# Patient Record
Sex: FEMALE | Race: BLACK OR AFRICAN AMERICAN
[De-identification: names, ages, dates, MRNs, and addresses within clinical notes are randomized per-mention and may not be internally consistent; named-entity substitution may affect disease eponyms.]

---

## 2019-01-18 ENCOUNTER — HOSPITAL ENCOUNTER (INPATIENT)
Dept: HOSPITAL 8 - 3E | Age: 59
LOS: 13 days | Discharge: HOME | DRG: 885 | End: 2019-01-31
Attending: PSYCHIATRY & NEUROLOGY | Admitting: PSYCHIATRY & NEUROLOGY
Payer: MEDICARE

## 2019-01-18 VITALS — BODY MASS INDEX: 24.65 KG/M2 | HEIGHT: 63 IN | WEIGHT: 139.11 LBS
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
## 2019-01-18 DIAGNOSIS — F17.210: ICD-10-CM

## 2019-01-18 DIAGNOSIS — F10.20: ICD-10-CM

## 2019-01-18 DIAGNOSIS — Z59.0: ICD-10-CM

## 2019-01-18 DIAGNOSIS — F20.0: Primary | ICD-10-CM

## 2019-01-18 DIAGNOSIS — R45.851: ICD-10-CM

## 2019-01-18 DIAGNOSIS — F32.9: ICD-10-CM

## 2019-01-18 DIAGNOSIS — E87.1: ICD-10-CM

## 2019-01-18 DIAGNOSIS — K08.89: ICD-10-CM

## 2019-01-18 PROCEDURE — 36415 COLL VENOUS BLD VENIPUNCTURE: CPT

## 2019-01-18 PROCEDURE — 86592 SYPHILIS TEST NON-TREP QUAL: CPT

## 2019-01-18 PROCEDURE — 93005 ELECTROCARDIOGRAM TRACING: CPT

## 2019-01-18 PROCEDURE — 81001 URINALYSIS AUTO W/SCOPE: CPT

## 2019-01-18 PROCEDURE — 82607 VITAMIN B-12: CPT

## 2019-01-18 PROCEDURE — 84703 CHORIONIC GONADOTROPIN ASSAY: CPT

## 2019-01-18 PROCEDURE — 84439 ASSAY OF FREE THYROXINE: CPT

## 2019-01-18 PROCEDURE — 85651 RBC SED RATE NONAUTOMATED: CPT

## 2019-01-18 PROCEDURE — 80061 LIPID PANEL: CPT

## 2019-01-18 PROCEDURE — 84443 ASSAY THYROID STIM HORMONE: CPT

## 2019-01-18 PROCEDURE — 82140 ASSAY OF AMMONIA: CPT

## 2019-01-18 PROCEDURE — G0378 HOSPITAL OBSERVATION PER HR: HCPCS

## 2019-01-18 PROCEDURE — 71045 X-RAY EXAM CHEST 1 VIEW: CPT

## 2019-01-18 SDOH — ECONOMIC STABILITY - HOUSING INSECURITY: HOMELESSNESS: Z59.0

## 2019-01-19 VITALS — SYSTOLIC BLOOD PRESSURE: 95 MMHG | DIASTOLIC BLOOD PRESSURE: 59 MMHG

## 2019-01-19 VITALS — DIASTOLIC BLOOD PRESSURE: 71 MMHG | SYSTOLIC BLOOD PRESSURE: 109 MMHG

## 2019-01-19 VITALS — SYSTOLIC BLOOD PRESSURE: 154 MMHG | DIASTOLIC BLOOD PRESSURE: 85 MMHG

## 2019-01-19 LAB
CHOL/HDL RATIO: 2.5
CULTURE INDICATED?: NO
ERYTHROCYTE [SEDIMENTATION RATE] IN BLOOD BY WESTERGREN METHOD: 14 MM/HR (ref 0–20)
HCT (SEDRATE): 37.7 % (ref 34.6–47.8)
HDL CHOL %: 40 % (ref 28–40)
HDL CHOLESTEROL (DIRECT): 70 MG/DL (ref 40–60)
LDL CHOLESTEROL,CALCULATED: 86 MG/DL (ref 54–169)
LDLC/HDLC SERPL: 1.2 {RATIO} (ref 0.5–3)
MICROSCOPIC: (no result)
T4 FREE SERPL-MCNC: 0.79 NG/DL (ref 0.76–1.46)
TRIGL SERPL-MCNC: 102 MG/DL (ref 50–200)
TSH SERPL-ACNC: 2.44 MIU/L (ref 0.36–3.74)
VLDLC SERPL CALC-MCNC: 20 MG/DL (ref 0–25)

## 2019-01-19 RX ADMIN — ACAMPROSATE CALCIUM ENTERIC-COATED SCH MG: 333 TABLET, DELAYED RELEASE ORAL at 15:57

## 2019-01-19 RX ADMIN — NICOTINE SCH PATCH: 14 PATCH, EXTENDED RELEASE TRANSDERMAL at 13:29

## 2019-01-19 RX ADMIN — ACAMPROSATE CALCIUM ENTERIC-COATED SCH MG: 333 TABLET, DELAYED RELEASE ORAL at 20:27

## 2019-01-19 RX ADMIN — QUETIAPINE FUMARATE SCH MG: 100 TABLET ORAL at 20:28

## 2019-01-19 RX ADMIN — NICOTINE SCH PATCH: 14 PATCH, EXTENDED RELEASE TRANSDERMAL at 01:27

## 2019-01-20 VITALS — DIASTOLIC BLOOD PRESSURE: 77 MMHG | SYSTOLIC BLOOD PRESSURE: 114 MMHG

## 2019-01-20 VITALS — SYSTOLIC BLOOD PRESSURE: 120 MMHG | DIASTOLIC BLOOD PRESSURE: 80 MMHG

## 2019-01-20 RX ADMIN — ACAMPROSATE CALCIUM ENTERIC-COATED SCH MG: 333 TABLET, DELAYED RELEASE ORAL at 09:08

## 2019-01-20 RX ADMIN — ACAMPROSATE CALCIUM ENTERIC-COATED SCH MG: 333 TABLET, DELAYED RELEASE ORAL at 16:38

## 2019-01-20 RX ADMIN — NICOTINE SCH PATCH: 14 PATCH, EXTENDED RELEASE TRANSDERMAL at 09:08

## 2019-01-20 RX ADMIN — QUETIAPINE FUMARATE SCH MG: 100 TABLET ORAL at 21:23

## 2019-01-20 RX ADMIN — ACAMPROSATE CALCIUM ENTERIC-COATED SCH MG: 333 TABLET, DELAYED RELEASE ORAL at 21:24

## 2019-01-20 RX ADMIN — QUETIAPINE FUMARATE PRN MG: 25 TABLET ORAL at 07:40

## 2019-01-20 RX ADMIN — NICOTINE SCH PATCH: 14 PATCH, EXTENDED RELEASE TRANSDERMAL at 00:30

## 2019-01-21 VITALS — SYSTOLIC BLOOD PRESSURE: 128 MMHG | DIASTOLIC BLOOD PRESSURE: 76 MMHG

## 2019-01-21 VITALS — DIASTOLIC BLOOD PRESSURE: 74 MMHG | SYSTOLIC BLOOD PRESSURE: 107 MMHG

## 2019-01-21 RX ADMIN — NICOTINE SCH PATCH: 14 PATCH, EXTENDED RELEASE TRANSDERMAL at 00:52

## 2019-01-21 RX ADMIN — ACAMPROSATE CALCIUM ENTERIC-COATED SCH MG: 333 TABLET, DELAYED RELEASE ORAL at 20:45

## 2019-01-21 RX ADMIN — QUETIAPINE FUMARATE SCH MG: 100 TABLET ORAL at 20:45

## 2019-01-21 RX ADMIN — NICOTINE SCH PATCH: 14 PATCH, EXTENDED RELEASE TRANSDERMAL at 13:07

## 2019-01-21 RX ADMIN — ACAMPROSATE CALCIUM ENTERIC-COATED SCH MG: 333 TABLET, DELAYED RELEASE ORAL at 16:23

## 2019-01-21 RX ADMIN — ACAMPROSATE CALCIUM ENTERIC-COATED SCH MG: 333 TABLET, DELAYED RELEASE ORAL at 08:34

## 2019-01-22 VITALS — DIASTOLIC BLOOD PRESSURE: 81 MMHG | SYSTOLIC BLOOD PRESSURE: 114 MMHG

## 2019-01-22 VITALS — SYSTOLIC BLOOD PRESSURE: 121 MMHG | DIASTOLIC BLOOD PRESSURE: 80 MMHG

## 2019-01-22 RX ADMIN — QUETIAPINE FUMARATE SCH MG: 100 TABLET ORAL at 20:20

## 2019-01-22 RX ADMIN — ACAMPROSATE CALCIUM ENTERIC-COATED SCH MG: 333 TABLET, DELAYED RELEASE ORAL at 20:20

## 2019-01-22 RX ADMIN — ACAMPROSATE CALCIUM ENTERIC-COATED SCH MG: 333 TABLET, DELAYED RELEASE ORAL at 09:43

## 2019-01-22 RX ADMIN — NICOTINE SCH PATCH: 14 PATCH, EXTENDED RELEASE TRANSDERMAL at 13:00

## 2019-01-22 RX ADMIN — NICOTINE SCH PATCH: 14 PATCH, EXTENDED RELEASE TRANSDERMAL at 01:03

## 2019-01-22 RX ADMIN — ACAMPROSATE CALCIUM ENTERIC-COATED SCH MG: 333 TABLET, DELAYED RELEASE ORAL at 17:01

## 2019-01-23 VITALS — SYSTOLIC BLOOD PRESSURE: 116 MMHG | DIASTOLIC BLOOD PRESSURE: 80 MMHG

## 2019-01-23 VITALS — SYSTOLIC BLOOD PRESSURE: 115 MMHG | DIASTOLIC BLOOD PRESSURE: 67 MMHG

## 2019-01-23 RX ADMIN — NICOTINE SCH PATCH: 14 PATCH, EXTENDED RELEASE TRANSDERMAL at 00:30

## 2019-01-23 RX ADMIN — QUETIAPINE FUMARATE SCH MG: 100 TABLET ORAL at 21:10

## 2019-01-23 RX ADMIN — ACAMPROSATE CALCIUM ENTERIC-COATED SCH MG: 333 TABLET, DELAYED RELEASE ORAL at 16:25

## 2019-01-23 RX ADMIN — ACAMPROSATE CALCIUM ENTERIC-COATED SCH MG: 333 TABLET, DELAYED RELEASE ORAL at 09:00

## 2019-01-23 RX ADMIN — ACAMPROSATE CALCIUM ENTERIC-COATED SCH MG: 333 TABLET, DELAYED RELEASE ORAL at 09:19

## 2019-01-23 RX ADMIN — ACETAMINOPHEN PRN MG: 325 TABLET, FILM COATED ORAL at 21:10

## 2019-01-23 RX ADMIN — NICOTINE SCH PATCH: 14 PATCH, EXTENDED RELEASE TRANSDERMAL at 14:00

## 2019-01-24 VITALS — DIASTOLIC BLOOD PRESSURE: 72 MMHG | SYSTOLIC BLOOD PRESSURE: 114 MMHG

## 2019-01-24 VITALS — DIASTOLIC BLOOD PRESSURE: 73 MMHG | SYSTOLIC BLOOD PRESSURE: 107 MMHG

## 2019-01-24 RX ADMIN — ACAMPROSATE CALCIUM ENTERIC-COATED SCH MG: 333 TABLET, DELAYED RELEASE ORAL at 21:09

## 2019-01-24 RX ADMIN — ACETAMINOPHEN PRN MG: 325 TABLET, FILM COATED ORAL at 15:51

## 2019-01-24 RX ADMIN — QUETIAPINE FUMARATE PRN MG: 25 TABLET ORAL at 08:38

## 2019-01-24 RX ADMIN — NICOTINE SCH PATCH: 14 PATCH, EXTENDED RELEASE TRANSDERMAL at 13:00

## 2019-01-24 RX ADMIN — ACETAMINOPHEN PRN MG: 325 TABLET, FILM COATED ORAL at 10:20

## 2019-01-24 RX ADMIN — ACAMPROSATE CALCIUM ENTERIC-COATED SCH MG: 333 TABLET, DELAYED RELEASE ORAL at 08:13

## 2019-01-24 RX ADMIN — NICOTINE SCH PATCH: 14 PATCH, EXTENDED RELEASE TRANSDERMAL at 08:00

## 2019-01-24 RX ADMIN — ACAMPROSATE CALCIUM ENTERIC-COATED SCH MG: 333 TABLET, DELAYED RELEASE ORAL at 15:41

## 2019-01-25 VITALS — SYSTOLIC BLOOD PRESSURE: 105 MMHG | DIASTOLIC BLOOD PRESSURE: 72 MMHG

## 2019-01-25 VITALS — DIASTOLIC BLOOD PRESSURE: 68 MMHG | SYSTOLIC BLOOD PRESSURE: 109 MMHG

## 2019-01-25 RX ADMIN — ZIPRASIDONE HCL SCH MG: 20 CAPSULE ORAL at 19:39

## 2019-01-25 RX ADMIN — RISPERIDONE SCH MG: 2 TABLET ORAL at 19:39

## 2019-01-25 RX ADMIN — ACAMPROSATE CALCIUM ENTERIC-COATED SCH MG: 333 TABLET, DELAYED RELEASE ORAL at 19:39

## 2019-01-25 RX ADMIN — ACETAMINOPHEN PRN MG: 325 TABLET, FILM COATED ORAL at 08:06

## 2019-01-25 RX ADMIN — NICOTINE SCH PATCH: 14 PATCH, EXTENDED RELEASE TRANSDERMAL at 15:28

## 2019-01-25 RX ADMIN — ACAMPROSATE CALCIUM ENTERIC-COATED SCH MG: 333 TABLET, DELAYED RELEASE ORAL at 08:06

## 2019-01-25 RX ADMIN — ACAMPROSATE CALCIUM ENTERIC-COATED SCH MG: 333 TABLET, DELAYED RELEASE ORAL at 15:27

## 2019-01-25 RX ADMIN — ACETAMINOPHEN PRN MG: 325 TABLET, FILM COATED ORAL at 18:35

## 2019-01-26 VITALS — DIASTOLIC BLOOD PRESSURE: 68 MMHG | SYSTOLIC BLOOD PRESSURE: 103 MMHG

## 2019-01-26 VITALS — SYSTOLIC BLOOD PRESSURE: 132 MMHG | DIASTOLIC BLOOD PRESSURE: 85 MMHG

## 2019-01-26 RX ADMIN — Medication PRN APPLIC: at 20:50

## 2019-01-26 RX ADMIN — ZIPRASIDONE HCL SCH MG: 20 CAPSULE ORAL at 08:14

## 2019-01-26 RX ADMIN — ZIPRASIDONE HCL SCH MG: 20 CAPSULE ORAL at 20:49

## 2019-01-26 RX ADMIN — ACETAMINOPHEN PRN MG: 325 TABLET, FILM COATED ORAL at 14:08

## 2019-01-26 RX ADMIN — ACETAMINOPHEN PRN MG: 325 TABLET, FILM COATED ORAL at 09:48

## 2019-01-26 RX ADMIN — ACAMPROSATE CALCIUM ENTERIC-COATED SCH MG: 333 TABLET, DELAYED RELEASE ORAL at 20:49

## 2019-01-26 RX ADMIN — RISPERIDONE SCH MG: 2 TABLET ORAL at 08:15

## 2019-01-26 RX ADMIN — RISPERIDONE SCH MG: 2 TABLET ORAL at 20:50

## 2019-01-26 RX ADMIN — ACAMPROSATE CALCIUM ENTERIC-COATED SCH MG: 333 TABLET, DELAYED RELEASE ORAL at 15:55

## 2019-01-26 RX ADMIN — ACETAMINOPHEN PRN MG: 325 TABLET, FILM COATED ORAL at 20:50

## 2019-01-26 RX ADMIN — Medication PRN APPLIC: at 14:08

## 2019-01-26 RX ADMIN — ACAMPROSATE CALCIUM ENTERIC-COATED SCH MG: 333 TABLET, DELAYED RELEASE ORAL at 08:14

## 2019-01-26 RX ADMIN — NICOTINE SCH PATCH: 14 PATCH, EXTENDED RELEASE TRANSDERMAL at 13:41

## 2019-01-27 VITALS — SYSTOLIC BLOOD PRESSURE: 137 MMHG | DIASTOLIC BLOOD PRESSURE: 80 MMHG

## 2019-01-27 VITALS — SYSTOLIC BLOOD PRESSURE: 121 MMHG | DIASTOLIC BLOOD PRESSURE: 82 MMHG

## 2019-01-27 RX ADMIN — ACAMPROSATE CALCIUM ENTERIC-COATED SCH MG: 333 TABLET, DELAYED RELEASE ORAL at 08:06

## 2019-01-27 RX ADMIN — Medication PRN APPLIC: at 18:18

## 2019-01-27 RX ADMIN — RISPERIDONE SCH MG: 2 TABLET ORAL at 20:48

## 2019-01-27 RX ADMIN — DOCUSATE SODIUM PRN MG: 100 CAPSULE, LIQUID FILLED ORAL at 08:14

## 2019-01-27 RX ADMIN — RISPERIDONE SCH MG: 2 TABLET ORAL at 08:06

## 2019-01-27 RX ADMIN — ACETAMINOPHEN PRN MG: 325 TABLET, FILM COATED ORAL at 08:07

## 2019-01-27 RX ADMIN — ACETAMINOPHEN PRN MG: 325 TABLET, FILM COATED ORAL at 13:58

## 2019-01-27 RX ADMIN — NICOTINE SCH PATCH: 14 PATCH, EXTENDED RELEASE TRANSDERMAL at 13:51

## 2019-01-27 RX ADMIN — ACAMPROSATE CALCIUM ENTERIC-COATED SCH MG: 333 TABLET, DELAYED RELEASE ORAL at 20:44

## 2019-01-27 RX ADMIN — ACETAMINOPHEN PRN MG: 325 TABLET, FILM COATED ORAL at 19:21

## 2019-01-27 RX ADMIN — NICOTINE SCH PATCH: 14 PATCH, EXTENDED RELEASE TRANSDERMAL at 13:53

## 2019-01-27 RX ADMIN — ZIPRASIDONE HCL SCH MG: 20 CAPSULE ORAL at 08:06

## 2019-01-27 RX ADMIN — ZIPRASIDONE HCL SCH MG: 20 CAPSULE ORAL at 20:45

## 2019-01-27 RX ADMIN — Medication PRN APPLIC: at 09:40

## 2019-01-27 RX ADMIN — ACAMPROSATE CALCIUM ENTERIC-COATED SCH MG: 333 TABLET, DELAYED RELEASE ORAL at 16:58

## 2019-01-28 VITALS — DIASTOLIC BLOOD PRESSURE: 84 MMHG | SYSTOLIC BLOOD PRESSURE: 125 MMHG

## 2019-01-28 VITALS — DIASTOLIC BLOOD PRESSURE: 84 MMHG | SYSTOLIC BLOOD PRESSURE: 126 MMHG

## 2019-01-28 RX ADMIN — RISPERIDONE SCH MG: 2 TABLET ORAL at 20:13

## 2019-01-28 RX ADMIN — Medication PRN APPLIC: at 18:31

## 2019-01-28 RX ADMIN — DOCUSATE SODIUM PRN MG: 100 CAPSULE, LIQUID FILLED ORAL at 08:18

## 2019-01-28 RX ADMIN — ACETAMINOPHEN PRN MG: 325 TABLET, FILM COATED ORAL at 20:14

## 2019-01-28 RX ADMIN — RISPERIDONE SCH MG: 2 TABLET ORAL at 08:07

## 2019-01-28 RX ADMIN — ACAMPROSATE CALCIUM ENTERIC-COATED SCH MG: 333 TABLET, DELAYED RELEASE ORAL at 20:12

## 2019-01-28 RX ADMIN — ZIPRASIDONE HCL SCH MG: 20 CAPSULE ORAL at 08:06

## 2019-01-28 RX ADMIN — ACAMPROSATE CALCIUM ENTERIC-COATED SCH MG: 333 TABLET, DELAYED RELEASE ORAL at 08:06

## 2019-01-28 RX ADMIN — NICOTINE SCH PATCH: 14 PATCH, EXTENDED RELEASE TRANSDERMAL at 13:34

## 2019-01-28 RX ADMIN — ACAMPROSATE CALCIUM ENTERIC-COATED SCH MG: 333 TABLET, DELAYED RELEASE ORAL at 16:55

## 2019-01-28 RX ADMIN — ACETAMINOPHEN PRN MG: 325 TABLET, FILM COATED ORAL at 13:43

## 2019-01-28 RX ADMIN — ACETAMINOPHEN PRN MG: 325 TABLET, FILM COATED ORAL at 08:07

## 2019-01-28 RX ADMIN — ZIPRASIDONE HCL SCH MG: 20 CAPSULE ORAL at 20:12

## 2019-01-29 VITALS — DIASTOLIC BLOOD PRESSURE: 77 MMHG | SYSTOLIC BLOOD PRESSURE: 119 MMHG

## 2019-01-29 VITALS — SYSTOLIC BLOOD PRESSURE: 105 MMHG | DIASTOLIC BLOOD PRESSURE: 64 MMHG

## 2019-01-29 RX ADMIN — ZIPRASIDONE HCL SCH MG: 20 CAPSULE ORAL at 20:20

## 2019-01-29 RX ADMIN — ACETAMINOPHEN PRN MG: 325 TABLET, FILM COATED ORAL at 08:14

## 2019-01-29 RX ADMIN — NICOTINE SCH PATCH: 14 PATCH, EXTENDED RELEASE TRANSDERMAL at 13:50

## 2019-01-29 RX ADMIN — ACETAMINOPHEN PRN MG: 325 TABLET, FILM COATED ORAL at 13:49

## 2019-01-29 RX ADMIN — DOCUSATE SODIUM PRN MG: 100 CAPSULE, LIQUID FILLED ORAL at 08:14

## 2019-01-29 RX ADMIN — ACAMPROSATE CALCIUM ENTERIC-COATED SCH MG: 333 TABLET, DELAYED RELEASE ORAL at 08:15

## 2019-01-29 RX ADMIN — ACAMPROSATE CALCIUM ENTERIC-COATED SCH MG: 333 TABLET, DELAYED RELEASE ORAL at 20:19

## 2019-01-29 RX ADMIN — RISPERIDONE SCH MG: 2 TABLET ORAL at 08:15

## 2019-01-29 RX ADMIN — ZIPRASIDONE HCL SCH MG: 20 CAPSULE ORAL at 08:14

## 2019-01-29 RX ADMIN — ACAMPROSATE CALCIUM ENTERIC-COATED SCH MG: 333 TABLET, DELAYED RELEASE ORAL at 16:47

## 2019-01-29 RX ADMIN — RISPERIDONE SCH MG: 2 TABLET ORAL at 20:20

## 2019-01-30 VITALS — DIASTOLIC BLOOD PRESSURE: 74 MMHG | SYSTOLIC BLOOD PRESSURE: 112 MMHG

## 2019-01-30 VITALS — SYSTOLIC BLOOD PRESSURE: 107 MMHG | DIASTOLIC BLOOD PRESSURE: 72 MMHG

## 2019-01-30 RX ADMIN — ZIPRASIDONE HCL SCH MG: 20 CAPSULE ORAL at 20:29

## 2019-01-30 RX ADMIN — ACAMPROSATE CALCIUM ENTERIC-COATED SCH MG: 333 TABLET, DELAYED RELEASE ORAL at 08:49

## 2019-01-30 RX ADMIN — RISPERIDONE SCH MG: 2 TABLET ORAL at 08:50

## 2019-01-30 RX ADMIN — ZIPRASIDONE HCL SCH MG: 20 CAPSULE ORAL at 08:50

## 2019-01-30 RX ADMIN — NICOTINE SCH PATCH: 14 PATCH, EXTENDED RELEASE TRANSDERMAL at 12:51

## 2019-01-30 RX ADMIN — ACAMPROSATE CALCIUM ENTERIC-COATED SCH MG: 333 TABLET, DELAYED RELEASE ORAL at 15:47

## 2019-01-30 RX ADMIN — ACAMPROSATE CALCIUM ENTERIC-COATED SCH MG: 333 TABLET, DELAYED RELEASE ORAL at 09:19

## 2019-01-31 VITALS — SYSTOLIC BLOOD PRESSURE: 106 MMHG | DIASTOLIC BLOOD PRESSURE: 75 MMHG

## 2019-01-31 RX ADMIN — ZIPRASIDONE HCL SCH MG: 20 CAPSULE ORAL at 07:54

## 2019-01-31 RX ADMIN — ACAMPROSATE CALCIUM ENTERIC-COATED SCH MG: 333 TABLET, DELAYED RELEASE ORAL at 07:53

## 2019-01-31 RX ADMIN — NICOTINE SCH PATCH: 14 PATCH, EXTENDED RELEASE TRANSDERMAL at 09:14

## 2019-02-06 ENCOUNTER — HOSPITAL ENCOUNTER (EMERGENCY)
Dept: HOSPITAL 8 - ED | Age: 59
Discharge: PSYCHIATRIC OR SUBSTANCE USE FACILITY | End: 2019-02-06
Payer: MEDICARE

## 2019-02-06 VITALS — WEIGHT: 137.79 LBS | BODY MASS INDEX: 24.41 KG/M2 | HEIGHT: 63 IN

## 2019-02-06 VITALS — SYSTOLIC BLOOD PRESSURE: 130 MMHG | DIASTOLIC BLOOD PRESSURE: 90 MMHG

## 2019-02-06 DIAGNOSIS — F23: Primary | ICD-10-CM

## 2019-02-06 DIAGNOSIS — Z72.9: ICD-10-CM

## 2019-02-06 LAB
ALBUMIN SERPL-MCNC: 4.1 G/DL (ref 3.4–5)
ALP SERPL-CCNC: 75 U/L (ref 45–117)
ALT SERPL-CCNC: 23 U/L (ref 12–78)
ANION GAP SERPL CALC-SCNC: 10 MMOL/L (ref 5–15)
APAP SERPL-MCNC: < 2 MCG/ML (ref 10–30)
BASOPHILS # BLD AUTO: 0.12 X10^3/UL (ref 0–0.1)
BASOPHILS NFR BLD AUTO: 1 % (ref 0–1)
BILIRUB SERPL-MCNC: 0.2 MG/DL (ref 0.2–1)
CALCIUM SERPL-MCNC: 9.3 MG/DL (ref 8.5–10.1)
CHLORIDE SERPL-SCNC: 109 MMOL/L (ref 98–107)
CREAT SERPL-MCNC: 0.94 MG/DL (ref 0.55–1.02)
EOSINOPHIL # BLD AUTO: 0.12 X10^3/UL (ref 0–0.4)
EOSINOPHIL NFR BLD AUTO: 1 % (ref 1–7)
ERYTHROCYTE [DISTWIDTH] IN BLOOD BY AUTOMATED COUNT: 14 % (ref 9.6–15.2)
LYMPHOCYTES # BLD AUTO: 4.41 X10^3/UL (ref 1–3.4)
LYMPHOCYTES NFR BLD AUTO: 40 % (ref 22–44)
MCH RBC QN AUTO: 32 PG (ref 27–34.8)
MCHC RBC AUTO-ENTMCNC: 33.3 G/DL (ref 32.4–35.8)
MCV RBC AUTO: 95.9 FL (ref 80–100)
MD: (no result)
MONOCYTES # BLD AUTO: 0.48 X10^3/UL (ref 0.2–0.8)
MONOCYTES NFR BLD AUTO: 4 % (ref 2–9)
NEUTROPHILS # BLD AUTO: 5.78 X10^3/UL (ref 1.8–6.8)
NEUTROPHILS NFR BLD AUTO: 53 % (ref 42–75)
PLATELET # BLD AUTO: 377 X10^3/UL (ref 130–400)
PMV BLD AUTO: 8.9 FL (ref 7.4–10.4)
PROT SERPL-MCNC: 9 G/DL (ref 6.4–8.2)
RBC # BLD AUTO: 5.01 X10^6/UL (ref 3.82–5.3)
VANCOMYCIN TROUGH SERPL-MCNC: 5.4 MG/DL (ref 2.8–20)

## 2019-02-06 PROCEDURE — G0480 DRUG TEST DEF 1-7 CLASSES: HCPCS

## 2019-02-06 PROCEDURE — 99284 EMERGENCY DEPT VISIT MOD MDM: CPT

## 2019-02-06 PROCEDURE — 85025 COMPLETE CBC W/AUTO DIFF WBC: CPT

## 2019-02-06 PROCEDURE — 93005 ELECTROCARDIOGRAM TRACING: CPT

## 2019-02-06 PROCEDURE — 80053 COMPREHEN METABOLIC PANEL: CPT

## 2019-02-06 PROCEDURE — 80307 DRUG TEST PRSMV CHEM ANLYZR: CPT

## 2019-02-06 PROCEDURE — 99285 EMERGENCY DEPT VISIT HI MDM: CPT

## 2019-02-06 PROCEDURE — 80329 ANALGESICS NON-OPIOID 1 OR 2: CPT

## 2019-02-06 PROCEDURE — G0378 HOSPITAL OBSERVATION PER HR: HCPCS

## 2019-02-06 PROCEDURE — 36415 COLL VENOUS BLD VENIPUNCTURE: CPT

## 2019-02-06 PROCEDURE — 96372 THER/PROPH/DIAG INJ SC/IM: CPT

## 2019-02-06 NOTE — NUR
THIS IS A 57 YO FEMALE WHO PRESENTS TO THE ER VIA AMBULANCE. PT WAS PICKED UP 
FROM THE BUS STATION AT [address] [place] [address] DURING A PYSCHOTIC EPISODE AND PT EXPRESSING 
SI THOUGHTS. PT IS AO X 4. PT HAS PRESSURED SPEECH, FLIGHT OF IDEAS. PT STATES 
"I'M ALREADY DEAD" AND HER PLAN IS TO "DRINK A BUNCH OF BEER". PT STATES SHE 
HAS NOT HAD MEDICATIONS SINCE THURSDAY AT LEAST. PT ADMITS TO DRINKING BEER 
TODAY. PT IS COOPERATIVE WITH RN BUT BECOMES AGITATED EASILY. BELONGINGS PLACED 
IN TWO BAGS AND PUT IN LOCKED LOCKER. GARAGE DOORS DOWN IN ROOM. SITTER AT 
DOORSIDE.

## 2019-02-06 NOTE — NUR
Provided bedside report to NENA Avila. All questions answered. NENA Avila to 
assume care of pt at this time.

## 2019-02-06 NOTE — NUR
RECEIVED REPORT AND ASSUMED PT. CARE.  PT.'S ROOM REMAINS SECURED WITH THE 
SITTER OUTSIDE OF HER ROOM.  PT. IS CALM AND COOPERATIVE.  PT. WAS GIVEN 
BLANKETS FOR WARMTH, HAS WATER AND IS AWARE THAT URINE IS NEEDED.

## 2019-02-06 NOTE — NUR
Patient/[facility] given discharge instructions and they have confirmed that they 
understand the instructions.  Patient ambulatory with steady gait with EMS 
crew. Pt transported with 2 bags of belongings.

## 2019-02-06 NOTE — NUR
SOC SPOKE WITH THE PT.  PT. IS A LEGAL 2K.  PT. ASKED THE SOC TO KILL HER WITH 
MEDICATION.  PT. IS SCREAMING AT THE SOC DURING THE INTERVIEW.

## 2019-02-06 NOTE — NUR
NENA Berg from [facility] called and requested report. Provided report. All 
questions answered. NENA Berg to call back with further information if [facility] is 
able to accept pt.

## 2019-02-06 NOTE — NUR
Pt recieved dinner tray. Pt appreciative. Pt watching TV and eating. NADN. 
Sitter near doorway in direct line of sight of pt. No needs requested at this 
time.

## 2019-02-06 NOTE — NUR
BEDSIDE REPORT RECEIVED FROM NENA MCKEON. ASSUMED CARE OF PT. PT SLEEPING ON 
GURNEY. RESPIRATIONS EVEN AND UNLABORED AT THIS TIME. AWAITING TRANSPORT TO [facility] 
AT THIS TIME. WILL CONTINUE TO MONITOR.

## 2019-02-06 NOTE — NUR
PT. IS SCREAMING PROFANITIES AND STATING THAT SHE WANTS US TO KILL HER.  PT. 
WAS MEDICATED AS ORDERED.

## 2019-02-06 NOTE — NUR
Recieved report from TASK RN Tracy. All questions answered. Assuming care of 
pt. Pt transported on gurney to ED room 41 from ED room 2. Pt states 
understanding and compliance. Pt alert, oriented to place, situation, and self. 
Pt cooperative at this time with ED RN. Pt in ED room 41. Room secured for SI 
precautions. Sitter near doorway in direct line of sight for observation. NADN. 
No needs expressed at this time. Turned TV on for pt. Pt appreciative. Turned 
bright overhead lighting off for pt. Pt appreciative.

## 2019-02-06 NOTE — NUR
Pt resting on \[facility]\"" watching TV. NADN. No needs requested at this 
time. Sitter near doorway in direct line of sight for observation.

## 2019-02-06 NOTE — NUR
BREAK RN: SBAR report received from Nahomi BARRETT. Pt sleeping on gurney, on right 
side. Sitter outside of room for pt safety.

## 2020-04-27 ENCOUNTER — HOSPITAL ENCOUNTER (INPATIENT)
Dept: HOSPITAL 8 - ED | Age: 60
LOS: 1 days | Discharge: PSYCHIATRIC OR SUBSTANCE USE FACILITY | DRG: 641 | End: 2020-04-28
Attending: INTERNAL MEDICINE | Admitting: INTERNAL MEDICINE
Payer: MEDICARE

## 2020-04-27 VITALS — DIASTOLIC BLOOD PRESSURE: 64 MMHG | SYSTOLIC BLOOD PRESSURE: 99 MMHG

## 2020-04-27 VITALS — WEIGHT: 164.46 LBS | BODY MASS INDEX: 29.14 KG/M2 | HEIGHT: 63 IN

## 2020-04-27 VITALS — SYSTOLIC BLOOD PRESSURE: 109 MMHG | DIASTOLIC BLOOD PRESSURE: 72 MMHG

## 2020-04-27 DIAGNOSIS — F20.0: ICD-10-CM

## 2020-04-27 DIAGNOSIS — F41.1: ICD-10-CM

## 2020-04-27 DIAGNOSIS — Z91.14: ICD-10-CM

## 2020-04-27 DIAGNOSIS — R45.851: ICD-10-CM

## 2020-04-27 DIAGNOSIS — E87.6: Primary | ICD-10-CM

## 2020-04-27 DIAGNOSIS — F17.200: ICD-10-CM

## 2020-04-27 LAB
ALBUMIN SERPL-MCNC: 2.8 G/DL (ref 3.4–5)
ANION GAP SERPL CALC-SCNC: 9 MMOL/L (ref 5–15)
BASOPHILS # BLD AUTO: 0.05 X10^3/UL (ref 0–0.1)
BASOPHILS NFR BLD AUTO: 1 % (ref 0–1)
CALCIUM SERPL-MCNC: 8.3 MG/DL (ref 8.5–10.1)
CHLORIDE SERPL-SCNC: 98 MMOL/L (ref 98–107)
CREAT SERPL-MCNC: 0.77 MG/DL (ref 0.55–1.02)
EOSINOPHIL # BLD AUTO: 0.02 X10^3/UL (ref 0–0.4)
EOSINOPHIL NFR BLD AUTO: 0 % (ref 1–7)
ERYTHROCYTE [DISTWIDTH] IN BLOOD BY AUTOMATED COUNT: 13.1 % (ref 9.6–15.2)
LYMPHOCYTES # BLD AUTO: 2.07 X10^3/UL (ref 1–3.4)
LYMPHOCYTES NFR BLD AUTO: 29 % (ref 22–44)
MCH RBC QN AUTO: 32.5 PG (ref 27–34.8)
MCHC RBC AUTO-ENTMCNC: 33.7 G/DL (ref 32.4–35.8)
MCV RBC AUTO: 96.4 FL (ref 80–100)
MD: (no result)
MONOCYTES # BLD AUTO: 0.59 X10^3/UL (ref 0.2–0.8)
MONOCYTES NFR BLD AUTO: 8 % (ref 2–9)
NEUTROPHILS # BLD AUTO: 4.36 X10^3/UL (ref 1.8–6.8)
NEUTROPHILS NFR BLD AUTO: 62 % (ref 42–75)
PLATELET # BLD AUTO: 274 X10^3/UL (ref 130–400)
PMV BLD AUTO: 8.5 FL (ref 7.4–10.4)
RBC # BLD AUTO: 4.17 X10^6/UL (ref 3.82–5.3)
VANCOMYCIN TROUGH SERPL-MCNC: 4.8 MG/DL (ref 2.8–20)

## 2020-04-27 PROCEDURE — 85025 COMPLETE CBC W/AUTO DIFF WBC: CPT

## 2020-04-27 PROCEDURE — 36415 COLL VENOUS BLD VENIPUNCTURE: CPT

## 2020-04-27 PROCEDURE — 96374 THER/PROPH/DIAG INJ IV PUSH: CPT

## 2020-04-27 PROCEDURE — 83735 ASSAY OF MAGNESIUM: CPT

## 2020-04-27 PROCEDURE — 80048 BASIC METABOLIC PNL TOTAL CA: CPT

## 2020-04-27 PROCEDURE — 82040 ASSAY OF SERUM ALBUMIN: CPT

## 2020-04-27 PROCEDURE — 93005 ELECTROCARDIOGRAM TRACING: CPT

## 2020-04-27 PROCEDURE — 80307 DRUG TEST PRSMV CHEM ANLYZR: CPT

## 2020-04-27 PROCEDURE — 84132 ASSAY OF SERUM POTASSIUM: CPT

## 2020-04-27 RX ADMIN — RISPERIDONE SCH MG: 2 TABLET ORAL at 12:30

## 2020-04-27 RX ADMIN — RISPERIDONE SCH MG: 2 TABLET ORAL at 20:47

## 2020-04-27 RX ADMIN — POTASSIUM CHLORIDE SCH MEQ: 20 TABLET, EXTENDED RELEASE ORAL at 18:33

## 2020-04-27 RX ADMIN — BENZTROPINE MESYLATE SCH MG: 1 TABLET ORAL at 12:30

## 2020-04-27 NOTE — NUR
POTASSIUM IVF INFUSING WITHOUT DIFFICULTY

   VITALS UPDATED-WNL



PROVIDED WITH LUNCH (PSYCHIATRIC PRECAUTIONS)



UP TO RESTROOM-AMBULATED WITHOUT DIFFICULTY 



PSYCHIATRIC TEAM AT BEDSIDE TO ASSESS

## 2020-04-27 NOTE — NUR
BIBA. REPORT RECEIVED FROM EMS. SI. DENIES HI. OFF MEDS FOR 1 WEEK. HX OF 
SCHIZOPHRENIA. PT'S AOX4. RESPS EVEN AND UNLABORED. BELONGINGS PUT INTO ONE BAG 
AND PUT INTO THE LOCKER.

## 2020-04-28 VITALS — DIASTOLIC BLOOD PRESSURE: 68 MMHG | SYSTOLIC BLOOD PRESSURE: 102 MMHG

## 2020-04-28 VITALS — SYSTOLIC BLOOD PRESSURE: 135 MMHG | DIASTOLIC BLOOD PRESSURE: 61 MMHG

## 2020-04-28 VITALS — SYSTOLIC BLOOD PRESSURE: 123 MMHG | DIASTOLIC BLOOD PRESSURE: 88 MMHG

## 2020-04-28 LAB
ANION GAP SERPL CALC-SCNC: 5 MMOL/L (ref 5–15)
CALCIUM SERPL-MCNC: 8.5 MG/DL (ref 8.5–10.1)
CHLORIDE SERPL-SCNC: 110 MMOL/L (ref 98–107)
CREAT SERPL-MCNC: 0.68 MG/DL (ref 0.55–1.02)

## 2020-04-28 RX ADMIN — POTASSIUM CHLORIDE SCH MEQ: 20 TABLET, EXTENDED RELEASE ORAL at 05:13

## 2020-04-28 RX ADMIN — BENZTROPINE MESYLATE SCH MG: 1 TABLET ORAL at 09:00

## 2020-04-28 RX ADMIN — RISPERIDONE SCH MG: 2 TABLET ORAL at 09:00

## 2020-07-08 ENCOUNTER — HOSPITAL ENCOUNTER (INPATIENT)
Dept: HOSPITAL 8 - 3E | Age: 60
LOS: 19 days | Discharge: LEFT BEFORE DISCHARGE | DRG: 885 | End: 2020-07-27
Attending: PSYCHIATRY & NEUROLOGY | Admitting: PSYCHIATRY & NEUROLOGY
Payer: MEDICARE

## 2020-07-08 ENCOUNTER — HOSPITAL ENCOUNTER (EMERGENCY)
Dept: HOSPITAL 8 - ED | Age: 60
Discharge: PSYCHIATRIC OR SUBSTANCE USE FACILITY | End: 2020-07-08
Payer: MEDICARE

## 2020-07-08 VITALS — HEIGHT: 62 IN | BODY MASS INDEX: 31.89 KG/M2 | WEIGHT: 173.28 LBS

## 2020-07-08 VITALS — SYSTOLIC BLOOD PRESSURE: 119 MMHG | DIASTOLIC BLOOD PRESSURE: 67 MMHG

## 2020-07-08 VITALS — BODY MASS INDEX: 31.96 KG/M2 | WEIGHT: 180.38 LBS | HEIGHT: 63 IN

## 2020-07-08 VITALS — SYSTOLIC BLOOD PRESSURE: 141 MMHG | DIASTOLIC BLOOD PRESSURE: 73 MMHG

## 2020-07-08 VITALS — DIASTOLIC BLOOD PRESSURE: 66 MMHG | SYSTOLIC BLOOD PRESSURE: 135 MMHG

## 2020-07-08 DIAGNOSIS — F17.200: ICD-10-CM

## 2020-07-08 DIAGNOSIS — Z81.8: ICD-10-CM

## 2020-07-08 DIAGNOSIS — F11.20: ICD-10-CM

## 2020-07-08 DIAGNOSIS — Z53.29: ICD-10-CM

## 2020-07-08 DIAGNOSIS — F10.20: ICD-10-CM

## 2020-07-08 DIAGNOSIS — E87.6: ICD-10-CM

## 2020-07-08 DIAGNOSIS — F20.0: ICD-10-CM

## 2020-07-08 DIAGNOSIS — E87.1: ICD-10-CM

## 2020-07-08 DIAGNOSIS — F32.9: ICD-10-CM

## 2020-07-08 DIAGNOSIS — Z91.14: ICD-10-CM

## 2020-07-08 DIAGNOSIS — R45.851: ICD-10-CM

## 2020-07-08 DIAGNOSIS — F33.2: Primary | ICD-10-CM

## 2020-07-08 DIAGNOSIS — F41.9: ICD-10-CM

## 2020-07-08 DIAGNOSIS — F15.21: ICD-10-CM

## 2020-07-08 DIAGNOSIS — R94.31: ICD-10-CM

## 2020-07-08 DIAGNOSIS — F60.3: ICD-10-CM

## 2020-07-08 DIAGNOSIS — Z79.899: ICD-10-CM

## 2020-07-08 DIAGNOSIS — E87.6: Primary | ICD-10-CM

## 2020-07-08 LAB
ALBUMIN SERPL-MCNC: 3.8 G/DL (ref 3.4–5)
ALP SERPL-CCNC: 108 U/L (ref 45–117)
ALT SERPL-CCNC: 20 U/L (ref 12–78)
ANION GAP SERPL CALC-SCNC: 10 MMOL/L (ref 5–15)
BASOPHILS # BLD AUTO: 0.04 X10^3/UL (ref 0–0.1)
BASOPHILS NFR BLD AUTO: 1 % (ref 0–1)
BILIRUB SERPL-MCNC: 0.7 MG/DL (ref 0.2–1)
CALCIUM SERPL-MCNC: 9.1 MG/DL (ref 8.5–10.1)
CHLORIDE SERPL-SCNC: 98 MMOL/L (ref 98–107)
CREAT SERPL-MCNC: 0.89 MG/DL (ref 0.55–1.02)
EOSINOPHIL # BLD AUTO: 0.02 X10^3/UL (ref 0–0.4)
EOSINOPHIL NFR BLD AUTO: 0 % (ref 1–7)
ERYTHROCYTE [DISTWIDTH] IN BLOOD BY AUTOMATED COUNT: 15.3 % (ref 9.6–15.2)
LYMPHOCYTES # BLD AUTO: 2.61 X10^3/UL (ref 1–3.4)
LYMPHOCYTES NFR BLD AUTO: 34 % (ref 22–44)
MCH RBC QN AUTO: 32.5 PG (ref 27–34.8)
MCHC RBC AUTO-ENTMCNC: 33.2 G/DL (ref 32.4–35.8)
MCV RBC AUTO: 97.9 FL (ref 80–100)
MD: NO
MICROSCOPIC: (no result)
MONOCYTES # BLD AUTO: 0.38 X10^3/UL (ref 0.2–0.8)
MONOCYTES NFR BLD AUTO: 5 % (ref 2–9)
NEUTROPHILS # BLD AUTO: 4.54 X10^3/UL (ref 1.8–6.8)
NEUTROPHILS NFR BLD AUTO: 60 % (ref 42–75)
PLATELET # BLD AUTO: 294 X10^3/UL (ref 130–400)
PMV BLD AUTO: 8.1 FL (ref 7.4–10.4)
PROT SERPL-MCNC: 8.2 G/DL (ref 6.4–8.2)
RBC # BLD AUTO: 4.51 X10^6/UL (ref 3.82–5.3)
TROPONIN I SERPL-MCNC: < 0.015 NG/ML (ref 0–0.04)
VANCOMYCIN TROUGH SERPL-MCNC: 5.1 MG/DL (ref 2.8–20)

## 2020-07-08 PROCEDURE — 36415 COLL VENOUS BLD VENIPUNCTURE: CPT

## 2020-07-08 PROCEDURE — 80307 DRUG TEST PRSMV CHEM ANLYZR: CPT

## 2020-07-08 PROCEDURE — 81003 URINALYSIS AUTO W/O SCOPE: CPT

## 2020-07-08 PROCEDURE — 83935 ASSAY OF URINE OSMOLALITY: CPT

## 2020-07-08 PROCEDURE — 71045 X-RAY EXAM CHEST 1 VIEW: CPT

## 2020-07-08 PROCEDURE — 85025 COMPLETE CBC W/AUTO DIFF WBC: CPT

## 2020-07-08 PROCEDURE — 84484 ASSAY OF TROPONIN QUANT: CPT

## 2020-07-08 PROCEDURE — 80053 COMPREHEN METABOLIC PANEL: CPT

## 2020-07-08 PROCEDURE — 80048 BASIC METABOLIC PNL TOTAL CA: CPT

## 2020-07-08 PROCEDURE — 83930 ASSAY OF BLOOD OSMOLALITY: CPT

## 2020-07-08 PROCEDURE — 93005 ELECTROCARDIOGRAM TRACING: CPT

## 2020-07-08 PROCEDURE — 80061 LIPID PANEL: CPT

## 2020-07-08 PROCEDURE — 83880 ASSAY OF NATRIURETIC PEPTIDE: CPT

## 2020-07-08 PROCEDURE — 99285 EMERGENCY DEPT VISIT HI MDM: CPT

## 2020-07-08 PROCEDURE — 82436 ASSAY OF URINE CHLORIDE: CPT

## 2020-07-08 PROCEDURE — 84133 ASSAY OF URINE POTASSIUM: CPT

## 2020-07-08 PROCEDURE — 84300 ASSAY OF URINE SODIUM: CPT

## 2020-07-08 RX ADMIN — CARBAMAZEPINE SCH MG: 200 TABLET ORAL at 20:43

## 2020-07-08 RX ADMIN — MIRTAZAPINE SCH MG: 15 TABLET, FILM COATED ORAL at 20:43

## 2020-07-08 NOTE — NUR
pt eating si lunch meal tray, tolerating well. pt a&o, resps even and 
unlabored. sitter monitoring from [facility] for safety. pt awaiting placement on 
U.

## 2020-07-08 NOTE — NUR
PT REPORTS SHE HAS HAD HEADACHE, LEFT EAR ACHE AND SORE THROAT, INTERMITTENT, 
"FOR A WHILE". PT DENIES OTHER SYMPTOMS. EDMD ROXIE NOTIFIED. EDMD 
REASSESSED PT. PT DENIES THESE SYMPTOMS AT THIS TIME. PER EDMD, NO ADDITIONAL 
TESTING REQUIRED AT THIS TIME, MD LOWE'D PT TO TRANSFER TO [facility] 
WHEN BED AVAILABLE. PT A&O, RESPS EVEN AND UNLABORED, NADN. SI MEAL TRAY 
PROVIDED. SITTER MONITORING FROM [facility] FOR SAFETY.

## 2020-07-08 NOTE — NUR
LATE ENTRY FOR 1100: REPORT TAKEN FROM NENA XIE. PT SLEEPING IN ROOM, RESPS 
EVEN AND UNLABORED. SITTER MONITORING FROM [facility] FOR SAFETY.

## 2020-07-08 NOTE — NUR
LATE ENTRY FOR 1137: PT MEDICATED PER EMAR FOR HYPOKALEMIA, ALL MONITORS IN 
PLACE, PT IS NSR WITH NO ECTOPY NOTED. PT TOLERATED MEDICATION WELL, ASPIRATION 
PRECAUTIONS IN PLACE. NO S/SX ASPIRATION S/P MED ADMIN. PT IS A&O, RESPS EVEN 
AND UNLABORED, NEURO INTACT. CALL LIGHT IN REACH. BLANKET PROVIDED. PT IS CALM 
BUT TEARFUL, MAKING REMARKS ABOUT HOW MUCH SHE DISLIKES HER PLACE OF RESIDENCE. 
VERBAL SUPPORT PROVIDED. SITTER  MONITORING FROM [facility] FOR SAFETY.

## 2020-07-08 NOTE — NUR
BIB [facility] FOR SI W/ NO SPECIFIC PLAN. STATES SHE CAN'T TAKE CARE OF HERSELF 
ANYMORE. STATES "I'M DISABLED. I TRIED TO KILL MYSELF BY DRINKING. I DON'T 
DRINK". NO DETAILED PLAN. PT CALLED 911 AND ASKED THEM FOR THE BEST WAY TO KILL 
HERSELF. STATES SHE WANTS TO GO TO A MENTAL FACILITY THEN A GROUP HOME. STATES 
"I DON'T WANT TO SLEEP ON THE STREETS NO MORE". HX SCHIZOPHRENIA, DEPRESSION, 
ANXIETY. PERSONAL BELONGINGS BAG (1 OF 1) PLACED IN SECURE LOCKER. SITTER 
REQUESTED FOR ROOM.

## 2020-07-09 VITALS — SYSTOLIC BLOOD PRESSURE: 107 MMHG | DIASTOLIC BLOOD PRESSURE: 62 MMHG

## 2020-07-09 VITALS — DIASTOLIC BLOOD PRESSURE: 69 MMHG | SYSTOLIC BLOOD PRESSURE: 126 MMHG

## 2020-07-09 LAB
ANION GAP SERPL CALC-SCNC: 7 MMOL/L (ref 5–15)
BASOPHILS # BLD AUTO: 0.02 X10^3/UL (ref 0–0.1)
BASOPHILS NFR BLD AUTO: 0 % (ref 0–1)
CALCIUM SERPL-MCNC: 9.2 MG/DL (ref 8.5–10.1)
CHLORIDE SERPL-SCNC: 108 MMOL/L (ref 98–107)
CHOL/HDL RATIO: 3.2
CREAT SERPL-MCNC: 0.89 MG/DL (ref 0.55–1.02)
EOSINOPHIL # BLD AUTO: 0.03 X10^3/UL (ref 0–0.4)
EOSINOPHIL NFR BLD AUTO: 0 % (ref 1–7)
ERYTHROCYTE [DISTWIDTH] IN BLOOD BY AUTOMATED COUNT: 15.9 % (ref 9.6–15.2)
HDL CHOL %: 32 % (ref 28–40)
HDL CHOLESTEROL (DIRECT): 60 MG/DL (ref 40–60)
LDL CHOLESTEROL,CALCULATED: 108 MG/DL (ref 54–169)
LDLC/HDLC SERPL: 1.8 {RATIO} (ref 0.5–3)
LYMPHOCYTES # BLD AUTO: 2.65 X10^3/UL (ref 1–3.4)
LYMPHOCYTES NFR BLD AUTO: 38 % (ref 22–44)
MCH RBC QN AUTO: 32.1 PG (ref 27–34.8)
MCHC RBC AUTO-ENTMCNC: 32.5 G/DL (ref 32.4–35.8)
MD: NO
MONOCYTES # BLD AUTO: 0.51 X10^3/UL (ref 0.2–0.8)
MONOCYTES NFR BLD AUTO: 7 % (ref 2–9)
NEUTROPHILS # BLD AUTO: 3.84 X10^3/UL (ref 1.8–6.8)
NEUTROPHILS NFR BLD AUTO: 55 % (ref 42–75)
PLATELET # BLD AUTO: 262 X10^3/UL (ref 130–400)
PMV BLD AUTO: 7.8 FL (ref 7.4–10.4)
RBC # BLD AUTO: 4.07 X10^6/UL (ref 3.82–5.3)
TRIGL SERPL-MCNC: 112 MG/DL (ref 50–200)
VLDLC SERPL CALC-MCNC: 22 MG/DL (ref 0–25)

## 2020-07-09 RX ADMIN — POTASSIUM CHLORIDE SCH MEQ: 20 TABLET, EXTENDED RELEASE ORAL at 08:30

## 2020-07-09 RX ADMIN — CARBAMAZEPINE SCH MG: 200 TABLET ORAL at 20:47

## 2020-07-09 RX ADMIN — MIRTAZAPINE SCH MG: 15 TABLET, FILM COATED ORAL at 20:47

## 2020-07-09 RX ADMIN — RISPERIDONE SCH MG: 2 TABLET ORAL at 08:30

## 2020-07-10 VITALS — SYSTOLIC BLOOD PRESSURE: 137 MMHG | DIASTOLIC BLOOD PRESSURE: 67 MMHG

## 2020-07-10 VITALS — SYSTOLIC BLOOD PRESSURE: 131 MMHG | DIASTOLIC BLOOD PRESSURE: 84 MMHG

## 2020-07-10 LAB
ANION GAP SERPL CALC-SCNC: 7 MMOL/L (ref 5–15)
CALCIUM SERPL-MCNC: 9.2 MG/DL (ref 8.5–10.1)
CHLORIDE SERPL-SCNC: 111 MMOL/L (ref 98–107)
CREAT SERPL-MCNC: 0.74 MG/DL (ref 0.55–1.02)

## 2020-07-10 RX ADMIN — CARBAMAZEPINE SCH MG: 200 TABLET ORAL at 20:24

## 2020-07-10 RX ADMIN — POTASSIUM CHLORIDE SCH MEQ: 20 TABLET, EXTENDED RELEASE ORAL at 09:01

## 2020-07-10 RX ADMIN — MIRTAZAPINE SCH MG: 15 TABLET, FILM COATED ORAL at 20:24

## 2020-07-10 RX ADMIN — RISPERIDONE SCH MG: 2 TABLET ORAL at 09:01

## 2020-07-10 RX ADMIN — ACETAMINOPHEN PRN MG: 325 TABLET, FILM COATED ORAL at 20:24

## 2020-07-10 RX ADMIN — NICOTINE SCH PATCH: 14 PATCH, EXTENDED RELEASE TRANSDERMAL at 10:43

## 2020-07-11 VITALS — SYSTOLIC BLOOD PRESSURE: 121 MMHG | DIASTOLIC BLOOD PRESSURE: 75 MMHG

## 2020-07-11 VITALS — SYSTOLIC BLOOD PRESSURE: 132 MMHG | DIASTOLIC BLOOD PRESSURE: 83 MMHG

## 2020-07-11 LAB
CHLORIDE,URINE RANDOM: 18 MMOL/L
OSMOLALITY,URINE: 159 MOSM/KG (ref 500–850)
POTASSIUM UR-SCNC: 9 MMOL/L
SODIUM,URINE RANDOM: 28 MMOL/L

## 2020-07-11 RX ADMIN — CARBAMAZEPINE SCH MG: 200 TABLET ORAL at 20:43

## 2020-07-11 RX ADMIN — MIRTAZAPINE SCH MG: 15 TABLET, FILM COATED ORAL at 20:43

## 2020-07-11 RX ADMIN — NICOTINE SCH PATCH: 14 PATCH, EXTENDED RELEASE TRANSDERMAL at 09:29

## 2020-07-11 RX ADMIN — POTASSIUM CHLORIDE SCH MEQ: 20 TABLET, EXTENDED RELEASE ORAL at 09:08

## 2020-07-11 RX ADMIN — RISPERIDONE SCH MG: 2 TABLET ORAL at 09:08

## 2020-07-11 RX ADMIN — NICOTINE SCH PATCH: 14 PATCH, EXTENDED RELEASE TRANSDERMAL at 09:09

## 2020-07-12 VITALS — SYSTOLIC BLOOD PRESSURE: 113 MMHG | DIASTOLIC BLOOD PRESSURE: 78 MMHG

## 2020-07-12 VITALS — DIASTOLIC BLOOD PRESSURE: 87 MMHG | SYSTOLIC BLOOD PRESSURE: 134 MMHG

## 2020-07-12 LAB
ANION GAP SERPL CALC-SCNC: 4 MMOL/L (ref 5–15)
CALCIUM SERPL-MCNC: 9.5 MG/DL (ref 8.5–10.1)
CHLORIDE SERPL-SCNC: 113 MMOL/L (ref 98–107)
CREAT SERPL-MCNC: 0.65 MG/DL (ref 0.55–1.02)

## 2020-07-12 RX ADMIN — RISPERIDONE SCH MG: 2 TABLET ORAL at 09:04

## 2020-07-12 RX ADMIN — POTASSIUM CHLORIDE SCH MEQ: 20 TABLET, EXTENDED RELEASE ORAL at 09:03

## 2020-07-12 RX ADMIN — NICOTINE SCH PATCH: 14 PATCH, EXTENDED RELEASE TRANSDERMAL at 10:19

## 2020-07-12 RX ADMIN — CARBAMAZEPINE SCH MG: 200 TABLET ORAL at 20:50

## 2020-07-12 RX ADMIN — ACETAMINOPHEN PRN MG: 325 TABLET, FILM COATED ORAL at 20:50

## 2020-07-12 RX ADMIN — MIRTAZAPINE SCH MG: 15 TABLET, FILM COATED ORAL at 20:50

## 2020-07-13 VITALS — SYSTOLIC BLOOD PRESSURE: 100 MMHG | DIASTOLIC BLOOD PRESSURE: 64 MMHG

## 2020-07-13 VITALS — DIASTOLIC BLOOD PRESSURE: 77 MMHG | SYSTOLIC BLOOD PRESSURE: 113 MMHG

## 2020-07-13 RX ADMIN — CARBAMAZEPINE SCH MG: 200 TABLET ORAL at 20:33

## 2020-07-13 RX ADMIN — RISPERIDONE SCH MG: 2 TABLET ORAL at 08:29

## 2020-07-13 RX ADMIN — DOCUSATE SODIUM PRN MG: 100 CAPSULE, LIQUID FILLED ORAL at 20:33

## 2020-07-13 RX ADMIN — ACETAMINOPHEN PRN MG: 325 TABLET, FILM COATED ORAL at 20:33

## 2020-07-13 RX ADMIN — NICOTINE SCH PATCH: 14 PATCH, EXTENDED RELEASE TRANSDERMAL at 09:00

## 2020-07-13 RX ADMIN — ACETAMINOPHEN PRN MG: 325 TABLET, FILM COATED ORAL at 18:02

## 2020-07-13 RX ADMIN — POTASSIUM CHLORIDE SCH MEQ: 20 TABLET, EXTENDED RELEASE ORAL at 08:29

## 2020-07-13 RX ADMIN — MIRTAZAPINE SCH MG: 15 TABLET, FILM COATED ORAL at 20:33

## 2020-07-13 RX ADMIN — DOCUSATE SODIUM PRN MG: 100 CAPSULE, LIQUID FILLED ORAL at 16:30

## 2020-07-14 VITALS — SYSTOLIC BLOOD PRESSURE: 104 MMHG | DIASTOLIC BLOOD PRESSURE: 68 MMHG

## 2020-07-14 VITALS — SYSTOLIC BLOOD PRESSURE: 112 MMHG | DIASTOLIC BLOOD PRESSURE: 67 MMHG

## 2020-07-14 RX ADMIN — ACETAMINOPHEN PRN MG: 325 TABLET, FILM COATED ORAL at 20:23

## 2020-07-14 RX ADMIN — POTASSIUM CHLORIDE SCH MEQ: 20 TABLET, EXTENDED RELEASE ORAL at 09:05

## 2020-07-14 RX ADMIN — MIRTAZAPINE SCH MG: 15 TABLET, FILM COATED ORAL at 20:20

## 2020-07-14 RX ADMIN — NICOTINE SCH PATCH: 14 PATCH, EXTENDED RELEASE TRANSDERMAL at 09:06

## 2020-07-14 RX ADMIN — CARBAMAZEPINE SCH MG: 200 TABLET ORAL at 20:20

## 2020-07-14 RX ADMIN — RISPERIDONE SCH MG: 2 TABLET ORAL at 09:05

## 2020-07-15 VITALS — SYSTOLIC BLOOD PRESSURE: 107 MMHG | DIASTOLIC BLOOD PRESSURE: 72 MMHG

## 2020-07-15 VITALS — DIASTOLIC BLOOD PRESSURE: 90 MMHG | SYSTOLIC BLOOD PRESSURE: 137 MMHG

## 2020-07-15 RX ADMIN — CARBAMAZEPINE SCH MG: 200 TABLET ORAL at 20:38

## 2020-07-15 RX ADMIN — ACETAMINOPHEN PRN MG: 325 TABLET, FILM COATED ORAL at 16:06

## 2020-07-15 RX ADMIN — MIRTAZAPINE SCH MG: 15 TABLET, FILM COATED ORAL at 20:34

## 2020-07-15 RX ADMIN — RISPERIDONE SCH MG: 2 TABLET ORAL at 09:04

## 2020-07-15 RX ADMIN — ACETAMINOPHEN PRN MG: 325 TABLET, FILM COATED ORAL at 09:03

## 2020-07-15 RX ADMIN — NICOTINE SCH PATCH: 14 PATCH, EXTENDED RELEASE TRANSDERMAL at 09:03

## 2020-07-15 RX ADMIN — ACETAMINOPHEN PRN MG: 325 TABLET, FILM COATED ORAL at 20:34

## 2020-07-15 RX ADMIN — LIDOCAINE SCH PATCH: 50 PATCH CUTANEOUS at 16:01

## 2020-07-16 VITALS — DIASTOLIC BLOOD PRESSURE: 71 MMHG | SYSTOLIC BLOOD PRESSURE: 107 MMHG

## 2020-07-16 VITALS — DIASTOLIC BLOOD PRESSURE: 77 MMHG | SYSTOLIC BLOOD PRESSURE: 128 MMHG

## 2020-07-16 RX ADMIN — Medication SCH NOTE: at 05:00

## 2020-07-16 RX ADMIN — RISPERIDONE SCH MG: 2 TABLET ORAL at 08:59

## 2020-07-16 RX ADMIN — NICOTINE SCH PATCH: 14 PATCH, EXTENDED RELEASE TRANSDERMAL at 08:59

## 2020-07-16 RX ADMIN — ACETAMINOPHEN PRN MG: 325 TABLET, FILM COATED ORAL at 09:00

## 2020-07-16 RX ADMIN — LIDOCAINE SCH PATCH: 50 PATCH CUTANEOUS at 16:43

## 2020-07-16 RX ADMIN — MIRTAZAPINE SCH MG: 15 TABLET, FILM COATED ORAL at 20:31

## 2020-07-16 RX ADMIN — CARBAMAZEPINE SCH MG: 200 TABLET ORAL at 20:32

## 2020-07-16 RX ADMIN — ACETAMINOPHEN PRN MG: 325 TABLET, FILM COATED ORAL at 20:31

## 2020-07-17 VITALS — SYSTOLIC BLOOD PRESSURE: 102 MMHG | DIASTOLIC BLOOD PRESSURE: 68 MMHG

## 2020-07-17 VITALS — DIASTOLIC BLOOD PRESSURE: 83 MMHG | SYSTOLIC BLOOD PRESSURE: 123 MMHG

## 2020-07-17 RX ADMIN — ACETAMINOPHEN PRN MG: 325 TABLET, FILM COATED ORAL at 09:46

## 2020-07-17 RX ADMIN — NICOTINE SCH PATCH: 14 PATCH, EXTENDED RELEASE TRANSDERMAL at 09:49

## 2020-07-17 RX ADMIN — RISPERIDONE SCH MG: 2 TABLET ORAL at 09:46

## 2020-07-17 RX ADMIN — Medication SCH NOTE: at 04:00

## 2020-07-17 RX ADMIN — ACETAMINOPHEN PRN MG: 325 TABLET, FILM COATED ORAL at 20:53

## 2020-07-17 RX ADMIN — CARBAMAZEPINE SCH MG: 200 TABLET ORAL at 20:43

## 2020-07-17 RX ADMIN — LIDOCAINE SCH PATCH: 50 PATCH CUTANEOUS at 16:21

## 2020-07-17 RX ADMIN — MIRTAZAPINE SCH MG: 15 TABLET, FILM COATED ORAL at 20:43

## 2020-07-18 VITALS — DIASTOLIC BLOOD PRESSURE: 76 MMHG | SYSTOLIC BLOOD PRESSURE: 121 MMHG

## 2020-07-18 VITALS — SYSTOLIC BLOOD PRESSURE: 119 MMHG | DIASTOLIC BLOOD PRESSURE: 64 MMHG

## 2020-07-18 RX ADMIN — CARBAMAZEPINE SCH MG: 200 TABLET ORAL at 20:21

## 2020-07-18 RX ADMIN — Medication SCH NOTE: at 09:00

## 2020-07-18 RX ADMIN — NICOTINE SCH PATCH: 14 PATCH, EXTENDED RELEASE TRANSDERMAL at 09:13

## 2020-07-18 RX ADMIN — DOCUSATE SODIUM PRN MG: 100 CAPSULE, LIQUID FILLED ORAL at 20:22

## 2020-07-18 RX ADMIN — DULOXETINE HYDROCHLORIDE SCH MG: 20 CAPSULE, DELAYED RELEASE ORAL at 09:18

## 2020-07-18 RX ADMIN — ACETAMINOPHEN PRN MG: 325 TABLET, FILM COATED ORAL at 20:22

## 2020-07-18 RX ADMIN — MIRTAZAPINE SCH MG: 15 TABLET, FILM COATED ORAL at 20:21

## 2020-07-18 RX ADMIN — RISPERIDONE SCH MG: 2 TABLET ORAL at 09:13

## 2020-07-18 RX ADMIN — LIDOCAINE SCH PATCH: 50 PATCH CUTANEOUS at 16:00

## 2020-07-19 VITALS — DIASTOLIC BLOOD PRESSURE: 87 MMHG | SYSTOLIC BLOOD PRESSURE: 136 MMHG

## 2020-07-19 VITALS — DIASTOLIC BLOOD PRESSURE: 83 MMHG | SYSTOLIC BLOOD PRESSURE: 110 MMHG

## 2020-07-19 RX ADMIN — RISPERIDONE SCH MG: 2 TABLET ORAL at 08:19

## 2020-07-19 RX ADMIN — LIDOCAINE SCH PATCH: 50 PATCH CUTANEOUS at 21:00

## 2020-07-19 RX ADMIN — DULOXETINE HYDROCHLORIDE SCH MG: 20 CAPSULE, DELAYED RELEASE ORAL at 08:19

## 2020-07-19 RX ADMIN — MIRTAZAPINE SCH MG: 15 TABLET, FILM COATED ORAL at 20:57

## 2020-07-19 RX ADMIN — Medication SCH NOTE: at 08:34

## 2020-07-19 RX ADMIN — ACETAMINOPHEN PRN MG: 325 TABLET, FILM COATED ORAL at 09:50

## 2020-07-19 RX ADMIN — NICOTINE SCH PATCH: 14 PATCH, EXTENDED RELEASE TRANSDERMAL at 08:34

## 2020-07-19 RX ADMIN — DOCUSATE SODIUM PRN MG: 100 CAPSULE, LIQUID FILLED ORAL at 20:56

## 2020-07-19 RX ADMIN — CARBAMAZEPINE SCH MG: 200 TABLET ORAL at 20:56

## 2020-07-19 RX ADMIN — ACETAMINOPHEN PRN MG: 325 TABLET, FILM COATED ORAL at 20:56

## 2020-07-20 VITALS — SYSTOLIC BLOOD PRESSURE: 118 MMHG | DIASTOLIC BLOOD PRESSURE: 68 MMHG

## 2020-07-20 VITALS — DIASTOLIC BLOOD PRESSURE: 59 MMHG | SYSTOLIC BLOOD PRESSURE: 98 MMHG

## 2020-07-20 RX ADMIN — ACETAMINOPHEN PRN MG: 325 TABLET, FILM COATED ORAL at 09:31

## 2020-07-20 RX ADMIN — Medication SCH NOTE: at 09:33

## 2020-07-20 RX ADMIN — NICOTINE SCH PATCH: 14 PATCH, EXTENDED RELEASE TRANSDERMAL at 09:28

## 2020-07-20 RX ADMIN — RISPERIDONE SCH MG: 2 TABLET ORAL at 09:23

## 2020-07-20 RX ADMIN — ACETAMINOPHEN PRN MG: 325 TABLET, FILM COATED ORAL at 20:04

## 2020-07-20 RX ADMIN — CARBAMAZEPINE SCH MG: 200 TABLET ORAL at 20:04

## 2020-07-20 RX ADMIN — LIDOCAINE SCH PATCH: 50 PATCH CUTANEOUS at 20:08

## 2020-07-20 RX ADMIN — MIRTAZAPINE SCH MG: 15 TABLET, FILM COATED ORAL at 20:04

## 2020-07-20 RX ADMIN — DULOXETINE HYDROCHLORIDE SCH MG: 20 CAPSULE, DELAYED RELEASE ORAL at 09:24

## 2020-07-21 VITALS — DIASTOLIC BLOOD PRESSURE: 86 MMHG | SYSTOLIC BLOOD PRESSURE: 125 MMHG

## 2020-07-21 VITALS — SYSTOLIC BLOOD PRESSURE: 95 MMHG | DIASTOLIC BLOOD PRESSURE: 61 MMHG

## 2020-07-21 RX ADMIN — Medication SCH NOTE: at 09:00

## 2020-07-21 RX ADMIN — ACETAMINOPHEN PRN MG: 325 TABLET, FILM COATED ORAL at 20:34

## 2020-07-21 RX ADMIN — ACETAMINOPHEN PRN MG: 325 TABLET, FILM COATED ORAL at 12:07

## 2020-07-21 RX ADMIN — MIRTAZAPINE SCH MG: 15 TABLET, FILM COATED ORAL at 20:34

## 2020-07-21 RX ADMIN — CARBAMAZEPINE SCH MG: 200 TABLET ORAL at 20:33

## 2020-07-21 RX ADMIN — DOCUSATE SODIUM PRN MG: 100 CAPSULE, LIQUID FILLED ORAL at 20:41

## 2020-07-21 RX ADMIN — NICOTINE SCH PATCH: 14 PATCH, EXTENDED RELEASE TRANSDERMAL at 09:00

## 2020-07-21 RX ADMIN — RISPERIDONE SCH MG: 2 TABLET ORAL at 09:00

## 2020-07-21 RX ADMIN — DULOXETINE HYDROCHLORIDE SCH MG: 20 CAPSULE, DELAYED RELEASE ORAL at 12:07

## 2020-07-21 RX ADMIN — LIDOCAINE SCH PATCH: 50 PATCH CUTANEOUS at 20:45

## 2020-07-22 VITALS — DIASTOLIC BLOOD PRESSURE: 73 MMHG | SYSTOLIC BLOOD PRESSURE: 120 MMHG

## 2020-07-22 VITALS — DIASTOLIC BLOOD PRESSURE: 75 MMHG | SYSTOLIC BLOOD PRESSURE: 117 MMHG

## 2020-07-22 RX ADMIN — RISPERIDONE SCH MG: 2 TABLET ORAL at 09:22

## 2020-07-22 RX ADMIN — LIDOCAINE SCH PATCH: 50 PATCH CUTANEOUS at 20:00

## 2020-07-22 RX ADMIN — DULOXETINE HYDROCHLORIDE SCH MG: 20 CAPSULE, DELAYED RELEASE ORAL at 09:21

## 2020-07-22 RX ADMIN — ACETAMINOPHEN PRN MG: 325 TABLET, FILM COATED ORAL at 19:55

## 2020-07-22 RX ADMIN — MIRTAZAPINE SCH MG: 15 TABLET, FILM COATED ORAL at 19:55

## 2020-07-22 RX ADMIN — ACETAMINOPHEN PRN MG: 325 TABLET, FILM COATED ORAL at 09:38

## 2020-07-22 RX ADMIN — CARBAMAZEPINE SCH MG: 200 TABLET ORAL at 19:54

## 2020-07-22 RX ADMIN — NICOTINE SCH PATCH: 14 PATCH, EXTENDED RELEASE TRANSDERMAL at 09:23

## 2020-07-22 RX ADMIN — Medication SCH NOTE: at 09:00

## 2020-07-22 RX ADMIN — DOCUSATE SODIUM PRN MG: 100 CAPSULE, LIQUID FILLED ORAL at 09:39

## 2020-07-23 VITALS — SYSTOLIC BLOOD PRESSURE: 102 MMHG | DIASTOLIC BLOOD PRESSURE: 68 MMHG

## 2020-07-23 VITALS — SYSTOLIC BLOOD PRESSURE: 114 MMHG | DIASTOLIC BLOOD PRESSURE: 82 MMHG

## 2020-07-23 RX ADMIN — MIRTAZAPINE SCH MG: 15 TABLET, FILM COATED ORAL at 20:25

## 2020-07-23 RX ADMIN — CARBAMAZEPINE SCH MG: 200 TABLET ORAL at 20:25

## 2020-07-23 RX ADMIN — ZIPRASIDONE HCL SCH MG: 20 CAPSULE ORAL at 20:25

## 2020-07-23 RX ADMIN — LIDOCAINE SCH PATCH: 50 PATCH CUTANEOUS at 20:27

## 2020-07-23 RX ADMIN — ZIPRASIDONE HCL SCH MG: 20 CAPSULE ORAL at 09:49

## 2020-07-23 RX ADMIN — Medication SCH NOTE: at 09:00

## 2020-07-23 RX ADMIN — DULOXETINE HYDROCHLORIDE SCH MG: 20 CAPSULE, DELAYED RELEASE ORAL at 09:51

## 2020-07-23 RX ADMIN — ACETAMINOPHEN PRN MG: 325 TABLET, FILM COATED ORAL at 19:28

## 2020-07-23 RX ADMIN — NICOTINE SCH PATCH: 14 PATCH, EXTENDED RELEASE TRANSDERMAL at 09:52

## 2020-07-23 RX ADMIN — CARBAMAZEPINE SCH MG: 200 TABLET ORAL at 21:00

## 2020-07-23 RX ADMIN — ACETAMINOPHEN PRN MG: 325 TABLET, FILM COATED ORAL at 09:50

## 2020-07-24 VITALS — SYSTOLIC BLOOD PRESSURE: 135 MMHG | DIASTOLIC BLOOD PRESSURE: 83 MMHG

## 2020-07-24 VITALS — SYSTOLIC BLOOD PRESSURE: 111 MMHG | DIASTOLIC BLOOD PRESSURE: 68 MMHG

## 2020-07-24 RX ADMIN — LIDOCAINE SCH PATCH: 50 PATCH CUTANEOUS at 21:00

## 2020-07-24 RX ADMIN — ZIPRASIDONE HCL SCH MG: 20 CAPSULE ORAL at 09:19

## 2020-07-24 RX ADMIN — NICOTINE SCH PATCH: 14 PATCH, EXTENDED RELEASE TRANSDERMAL at 09:28

## 2020-07-24 RX ADMIN — CARBAMAZEPINE SCH MG: 200 TABLET ORAL at 20:21

## 2020-07-24 RX ADMIN — ACETAMINOPHEN PRN MG: 325 TABLET, FILM COATED ORAL at 20:21

## 2020-07-24 RX ADMIN — ZIPRASIDONE HCL SCH MG: 20 CAPSULE ORAL at 20:21

## 2020-07-24 RX ADMIN — MIRTAZAPINE SCH MG: 15 TABLET, FILM COATED ORAL at 20:21

## 2020-07-24 RX ADMIN — Medication SCH NOTE: at 09:00

## 2020-07-24 RX ADMIN — ACETAMINOPHEN PRN MG: 325 TABLET, FILM COATED ORAL at 09:20

## 2020-07-24 RX ADMIN — DULOXETINE HYDROCHLORIDE SCH MG: 20 CAPSULE, DELAYED RELEASE ORAL at 09:19

## 2020-07-25 VITALS — DIASTOLIC BLOOD PRESSURE: 60 MMHG | SYSTOLIC BLOOD PRESSURE: 137 MMHG

## 2020-07-25 VITALS — SYSTOLIC BLOOD PRESSURE: 120 MMHG | DIASTOLIC BLOOD PRESSURE: 70 MMHG

## 2020-07-25 RX ADMIN — ACETAMINOPHEN PRN MG: 325 TABLET, FILM COATED ORAL at 14:39

## 2020-07-25 RX ADMIN — ACETAMINOPHEN PRN MG: 325 TABLET, FILM COATED ORAL at 08:52

## 2020-07-25 RX ADMIN — DULOXETINE HYDROCHLORIDE SCH MG: 20 CAPSULE, DELAYED RELEASE ORAL at 08:43

## 2020-07-25 RX ADMIN — CARBAMAZEPINE SCH MG: 200 TABLET ORAL at 20:24

## 2020-07-25 RX ADMIN — NICOTINE SCH PATCH: 14 PATCH, EXTENDED RELEASE TRANSDERMAL at 08:46

## 2020-07-25 RX ADMIN — LIDOCAINE SCH PATCH: 50 PATCH CUTANEOUS at 20:27

## 2020-07-25 RX ADMIN — ACETAMINOPHEN PRN MG: 325 TABLET, FILM COATED ORAL at 20:24

## 2020-07-25 RX ADMIN — MIRTAZAPINE SCH MG: 15 TABLET, FILM COATED ORAL at 20:25

## 2020-07-25 RX ADMIN — Medication SCH NOTE: at 08:48

## 2020-07-25 RX ADMIN — ZIPRASIDONE HCL SCH MG: 20 CAPSULE ORAL at 08:43

## 2020-07-25 RX ADMIN — ZIPRASIDONE HCL SCH MG: 20 CAPSULE ORAL at 20:25

## 2020-07-26 VITALS — DIASTOLIC BLOOD PRESSURE: 81 MMHG | SYSTOLIC BLOOD PRESSURE: 116 MMHG

## 2020-07-26 RX ADMIN — MIRTAZAPINE SCH MG: 15 TABLET, FILM COATED ORAL at 20:34

## 2020-07-26 RX ADMIN — DULOXETINE HYDROCHLORIDE SCH MG: 20 CAPSULE, DELAYED RELEASE ORAL at 09:34

## 2020-07-26 RX ADMIN — ACETAMINOPHEN PRN MG: 325 TABLET, FILM COATED ORAL at 09:46

## 2020-07-26 RX ADMIN — ZIPRASIDONE HCL SCH MG: 20 CAPSULE ORAL at 09:33

## 2020-07-26 RX ADMIN — ACETAMINOPHEN PRN MG: 325 TABLET, FILM COATED ORAL at 19:30

## 2020-07-26 RX ADMIN — ZIPRASIDONE HCL SCH MG: 20 CAPSULE ORAL at 20:33

## 2020-07-26 RX ADMIN — NICOTINE SCH PATCH: 14 PATCH, EXTENDED RELEASE TRANSDERMAL at 09:35

## 2020-07-26 RX ADMIN — LIDOCAINE SCH PATCH: 50 PATCH CUTANEOUS at 21:00

## 2020-07-26 RX ADMIN — Medication SCH NOTE: at 09:00

## 2020-07-27 VITALS — SYSTOLIC BLOOD PRESSURE: 119 MMHG | DIASTOLIC BLOOD PRESSURE: 69 MMHG

## 2020-07-27 RX ADMIN — Medication SCH NOTE: at 09:00

## 2020-07-27 RX ADMIN — NICOTINE SCH PATCH: 14 PATCH, EXTENDED RELEASE TRANSDERMAL at 09:00

## 2020-07-27 RX ADMIN — ACETAMINOPHEN PRN MG: 325 TABLET, FILM COATED ORAL at 16:00

## 2020-07-27 RX ADMIN — ZIPRASIDONE HCL SCH MG: 20 CAPSULE ORAL at 09:00

## 2020-07-27 RX ADMIN — DULOXETINE HYDROCHLORIDE SCH MG: 20 CAPSULE, DELAYED RELEASE ORAL at 09:00

## 2020-12-10 ENCOUNTER — HOSPITAL ENCOUNTER (INPATIENT)
Dept: HOSPITAL 8 - 3E | Age: 60
LOS: 6 days | Discharge: HOME | DRG: 885 | End: 2020-12-16
Attending: PSYCHIATRY & NEUROLOGY | Admitting: PSYCHIATRY & NEUROLOGY
Payer: MEDICARE

## 2020-12-10 VITALS — BODY MASS INDEX: 29.53 KG/M2 | HEIGHT: 62 IN | WEIGHT: 160.5 LBS

## 2020-12-10 VITALS — DIASTOLIC BLOOD PRESSURE: 76 MMHG | SYSTOLIC BLOOD PRESSURE: 145 MMHG

## 2020-12-10 VITALS — SYSTOLIC BLOOD PRESSURE: 145 MMHG | DIASTOLIC BLOOD PRESSURE: 76 MMHG

## 2020-12-10 DIAGNOSIS — E03.9: ICD-10-CM

## 2020-12-10 DIAGNOSIS — F20.0: Primary | ICD-10-CM

## 2020-12-10 DIAGNOSIS — F33.9: ICD-10-CM

## 2020-12-10 DIAGNOSIS — F15.20: ICD-10-CM

## 2020-12-10 DIAGNOSIS — E66.9: ICD-10-CM

## 2020-12-10 DIAGNOSIS — F41.9: ICD-10-CM

## 2020-12-10 DIAGNOSIS — Z79.899: ICD-10-CM

## 2020-12-10 DIAGNOSIS — E87.6: ICD-10-CM

## 2020-12-10 DIAGNOSIS — G89.29: ICD-10-CM

## 2020-12-10 DIAGNOSIS — G47.00: ICD-10-CM

## 2020-12-10 DIAGNOSIS — F17.200: ICD-10-CM

## 2020-12-10 PROCEDURE — 93005 ELECTROCARDIOGRAM TRACING: CPT

## 2020-12-11 VITALS — SYSTOLIC BLOOD PRESSURE: 118 MMHG | DIASTOLIC BLOOD PRESSURE: 75 MMHG

## 2020-12-11 VITALS — DIASTOLIC BLOOD PRESSURE: 76 MMHG | SYSTOLIC BLOOD PRESSURE: 119 MMHG

## 2020-12-11 VITALS — SYSTOLIC BLOOD PRESSURE: 117 MMHG | DIASTOLIC BLOOD PRESSURE: 72 MMHG

## 2020-12-11 VITALS — SYSTOLIC BLOOD PRESSURE: 121 MMHG | DIASTOLIC BLOOD PRESSURE: 79 MMHG

## 2020-12-11 VITALS — SYSTOLIC BLOOD PRESSURE: 147 MMHG | DIASTOLIC BLOOD PRESSURE: 88 MMHG

## 2020-12-11 RX ADMIN — Medication SCH NOTE: at 09:00

## 2020-12-11 RX ADMIN — LIDOCAINE SCH PATCH: 50 PATCH CUTANEOUS at 21:00

## 2020-12-11 RX ADMIN — ZIPRASIDONE HCL SCH MG: 20 CAPSULE ORAL at 09:00

## 2020-12-11 RX ADMIN — RISPERIDONE SCH MG: 1 TABLET ORAL at 09:00

## 2020-12-11 RX ADMIN — DIVALPROEX SODIUM SCH MG: 500 TABLET, DELAYED RELEASE ORAL at 09:00

## 2020-12-11 RX ADMIN — VENLAFAXINE HYDROCHLORIDE SCH MG: 75 CAPSULE, EXTENDED RELEASE ORAL at 15:04

## 2020-12-11 RX ADMIN — DIVALPROEX SODIUM SCH MG: 500 TABLET, DELAYED RELEASE ORAL at 15:04

## 2020-12-11 RX ADMIN — VENLAFAXINE HYDROCHLORIDE SCH MG: 75 CAPSULE, EXTENDED RELEASE ORAL at 09:00

## 2020-12-11 RX ADMIN — RISPERIDONE SCH MG: 1 TABLET ORAL at 15:04

## 2020-12-11 RX ADMIN — TRAZODONE HYDROCHLORIDE SCH MG: 50 TABLET ORAL at 21:00

## 2020-12-11 RX ADMIN — ZIPRASIDONE HCL SCH MG: 20 CAPSULE ORAL at 15:03

## 2020-12-11 RX ADMIN — TRAZODONE HYDROCHLORIDE SCH MG: 50 TABLET ORAL at 22:53

## 2020-12-11 NOTE — NUR
MALLORY GARDINER -  Fall Risk Medications present and NOT receiving anticoagulants.

HIGH FALL RISK MEDS

DEPAKOTE  MG BID

RISPERDAL 3 MG DAILY

TRAZODONE 50 MG HS

VENLAFAXINE 150 MG DAILY

ZIPRASIDONE 60 MG BID

-------------------------------------------------------------------------------

Signed:    12/11/20 at 1217 by Juliocesar BUSBY

-------------------------------------------------------------------------------

## 2020-12-12 VITALS — DIASTOLIC BLOOD PRESSURE: 84 MMHG | SYSTOLIC BLOOD PRESSURE: 135 MMHG

## 2020-12-12 VITALS — SYSTOLIC BLOOD PRESSURE: 128 MMHG | DIASTOLIC BLOOD PRESSURE: 87 MMHG

## 2020-12-12 VITALS — DIASTOLIC BLOOD PRESSURE: 80 MMHG | SYSTOLIC BLOOD PRESSURE: 119 MMHG

## 2020-12-12 RX ADMIN — LIDOCAINE SCH PATCH: 50 PATCH CUTANEOUS at 20:18

## 2020-12-12 RX ADMIN — TRAZODONE HYDROCHLORIDE SCH MG: 50 TABLET ORAL at 20:17

## 2020-12-12 RX ADMIN — DIVALPROEX SODIUM SCH MG: 500 TABLET, DELAYED RELEASE ORAL at 14:31

## 2020-12-12 RX ADMIN — VENLAFAXINE HYDROCHLORIDE SCH MG: 75 CAPSULE, EXTENDED RELEASE ORAL at 14:32

## 2020-12-12 RX ADMIN — ZIPRASIDONE HCL SCH MG: 20 CAPSULE ORAL at 14:32

## 2020-12-12 RX ADMIN — Medication SCH NOTE: at 09:00

## 2020-12-12 RX ADMIN — DIVALPROEX SODIUM SCH MG: 500 TABLET, DELAYED RELEASE ORAL at 05:46

## 2020-12-12 RX ADMIN — ACETAMINOPHEN PRN MG: 325 TABLET, FILM COATED ORAL at 15:16

## 2020-12-12 RX ADMIN — ZIPRASIDONE HCL SCH MG: 20 CAPSULE ORAL at 05:46

## 2020-12-13 RX ADMIN — LIDOCAINE SCH PATCH: 50 PATCH CUTANEOUS at 21:00

## 2020-12-13 RX ADMIN — TRAZODONE HYDROCHLORIDE SCH MG: 50 TABLET ORAL at 21:00

## 2020-12-13 RX ADMIN — ZIPRASIDONE HCL SCH MG: 20 CAPSULE ORAL at 05:19

## 2020-12-13 RX ADMIN — ACETAMINOPHEN PRN MG: 325 TABLET, FILM COATED ORAL at 10:12

## 2020-12-13 RX ADMIN — DIVALPROEX SODIUM SCH MG: 500 TABLET, DELAYED RELEASE ORAL at 17:00

## 2020-12-13 RX ADMIN — Medication SCH NOTE: at 09:00

## 2020-12-13 RX ADMIN — DIVALPROEX SODIUM SCH MG: 500 TABLET, DELAYED RELEASE ORAL at 05:20

## 2020-12-13 RX ADMIN — ZIPRASIDONE HCL SCH MG: 20 CAPSULE ORAL at 17:00

## 2020-12-14 VITALS — SYSTOLIC BLOOD PRESSURE: 117 MMHG | DIASTOLIC BLOOD PRESSURE: 75 MMHG

## 2020-12-14 RX ADMIN — DIVALPROEX SODIUM SCH MG: 500 TABLET, DELAYED RELEASE ORAL at 21:21

## 2020-12-14 RX ADMIN — Medication SCH NOTE: at 09:00

## 2020-12-14 RX ADMIN — TRAZODONE HYDROCHLORIDE SCH MG: 50 TABLET ORAL at 21:22

## 2020-12-14 RX ADMIN — VENLAFAXINE HYDROCHLORIDE SCH MG: 75 CAPSULE, EXTENDED RELEASE ORAL at 08:21

## 2020-12-14 RX ADMIN — LIDOCAINE SCH PATCH: 50 PATCH CUTANEOUS at 21:22

## 2020-12-14 RX ADMIN — ZIPRASIDONE HCL SCH MG: 20 CAPSULE ORAL at 08:20

## 2020-12-14 RX ADMIN — ZIPRASIDONE HCL SCH MG: 20 CAPSULE ORAL at 21:21

## 2020-12-14 RX ADMIN — DIVALPROEX SODIUM SCH MG: 500 TABLET, DELAYED RELEASE ORAL at 08:21

## 2020-12-15 VITALS — DIASTOLIC BLOOD PRESSURE: 75 MMHG | SYSTOLIC BLOOD PRESSURE: 118 MMHG

## 2020-12-15 RX ADMIN — TRAZODONE HYDROCHLORIDE SCH MG: 50 TABLET ORAL at 22:57

## 2020-12-15 RX ADMIN — DIVALPROEX SODIUM SCH MG: 500 TABLET, DELAYED RELEASE ORAL at 08:43

## 2020-12-15 RX ADMIN — Medication SCH NOTE: at 08:49

## 2020-12-15 RX ADMIN — DIVALPROEX SODIUM SCH MG: 500 TABLET, DELAYED RELEASE ORAL at 22:57

## 2020-12-15 RX ADMIN — ZIPRASIDONE HCL SCH MG: 20 CAPSULE ORAL at 08:42

## 2020-12-15 RX ADMIN — LIDOCAINE SCH PATCH: 50 PATCH CUTANEOUS at 22:57

## 2020-12-15 RX ADMIN — ZIPRASIDONE HCL SCH MG: 20 CAPSULE ORAL at 22:57

## 2020-12-15 RX ADMIN — VENLAFAXINE HYDROCHLORIDE SCH MG: 75 CAPSULE, EXTENDED RELEASE ORAL at 08:42

## 2020-12-16 RX ADMIN — DIVALPROEX SODIUM SCH MG: 500 TABLET, DELAYED RELEASE ORAL at 10:12

## 2020-12-16 RX ADMIN — ZIPRASIDONE HCL SCH MG: 20 CAPSULE ORAL at 10:11

## 2020-12-16 RX ADMIN — ACETAMINOPHEN PRN MG: 325 TABLET, FILM COATED ORAL at 12:22

## 2020-12-16 RX ADMIN — VENLAFAXINE HYDROCHLORIDE SCH MG: 75 CAPSULE, EXTENDED RELEASE ORAL at 10:11

## 2020-12-16 RX ADMIN — Medication SCH NOTE: at 09:00

## 2020-12-25 ENCOUNTER — HOSPITAL ENCOUNTER (EMERGENCY)
Dept: HOSPITAL 8 - ED | Age: 60
Discharge: HOME | End: 2020-12-25
Payer: MEDICARE

## 2020-12-25 VITALS — DIASTOLIC BLOOD PRESSURE: 89 MMHG | SYSTOLIC BLOOD PRESSURE: 145 MMHG

## 2020-12-25 VITALS — HEIGHT: 64 IN | WEIGHT: 161.6 LBS | BODY MASS INDEX: 27.59 KG/M2

## 2020-12-25 VITALS — DIASTOLIC BLOOD PRESSURE: 69 MMHG | SYSTOLIC BLOOD PRESSURE: 100 MMHG

## 2020-12-25 VITALS — HEIGHT: 61 IN | BODY MASS INDEX: 28.3 KG/M2 | WEIGHT: 149.91 LBS

## 2020-12-25 DIAGNOSIS — F20.9: ICD-10-CM

## 2020-12-25 DIAGNOSIS — E87.6: ICD-10-CM

## 2020-12-25 DIAGNOSIS — R45.851: Primary | ICD-10-CM
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
## 2020-12-25 DIAGNOSIS — F10.10: Primary | ICD-10-CM

## 2020-12-25 DIAGNOSIS — F17.210: ICD-10-CM

## 2020-12-25 DIAGNOSIS — F32.9: ICD-10-CM

## 2020-12-25 DIAGNOSIS — R45.851: ICD-10-CM

## 2020-12-25 LAB
ALBUMIN SERPL-MCNC: 3.5 G/DL (ref 3.4–5)
ALP SERPL-CCNC: 62 U/L (ref 45–117)
ALT SERPL-CCNC: 21 U/L (ref 12–78)
ANION GAP SERPL CALC-SCNC: 11 MMOL/L (ref 5–15)
BASOPHILS # BLD AUTO: 0.1 X10^3/UL (ref 0–0.1)
BASOPHILS NFR BLD AUTO: 1 % (ref 0–1)
BILIRUB SERPL-MCNC: 0.3 MG/DL (ref 0.2–1)
CALCIUM SERPL-MCNC: 9.1 MG/DL (ref 8.5–10.1)
CHLORIDE SERPL-SCNC: 106 MMOL/L (ref 98–107)
CREAT SERPL-MCNC: 0.63 MG/DL (ref 0.55–1.02)
EOSINOPHIL # BLD AUTO: 0.1 X10^3/UL (ref 0–0.4)
EOSINOPHIL NFR BLD AUTO: 1 % (ref 1–7)
ERYTHROCYTE [DISTWIDTH] IN BLOOD BY AUTOMATED COUNT: 13.1 % (ref 9.6–15.2)
LYMPHOCYTES # BLD AUTO: 2.6 X10^3/UL (ref 1–3.4)
LYMPHOCYTES NFR BLD AUTO: 25 % (ref 22–44)
MCH RBC QN AUTO: 33.3 PG (ref 27–34.8)
MCHC RBC AUTO-ENTMCNC: 34.7 G/DL (ref 32.4–35.8)
MD: NO
MONOCYTES # BLD AUTO: 0.7 X10^3/UL (ref 0.2–0.8)
MONOCYTES NFR BLD AUTO: 7 % (ref 2–9)
NEUTROPHILS # BLD AUTO: 6.9 X10^3/UL (ref 1.8–6.8)
NEUTROPHILS NFR BLD AUTO: 67 % (ref 42–75)
PLATELET # BLD AUTO: 443 X10^3/UL (ref 130–400)
PMV BLD AUTO: 7.7 FL (ref 7.4–10.4)
PROT SERPL-MCNC: 8.4 G/DL (ref 6.4–8.2)
RBC # BLD AUTO: 4.07 X10^6/UL (ref 3.82–5.3)
VANCOMYCIN TROUGH SERPL-MCNC: 4.4 MG/DL (ref 2.8–20)

## 2020-12-25 PROCEDURE — 84443 ASSAY THYROID STIM HORMONE: CPT

## 2020-12-25 PROCEDURE — G0480 DRUG TEST DEF 1-7 CLASSES: HCPCS

## 2020-12-25 PROCEDURE — 36415 COLL VENOUS BLD VENIPUNCTURE: CPT

## 2020-12-25 PROCEDURE — 80320 DRUG SCREEN QUANTALCOHOLS: CPT

## 2020-12-25 PROCEDURE — 99406 BEHAV CHNG SMOKING 3-10 MIN: CPT

## 2020-12-25 PROCEDURE — 99284 EMERGENCY DEPT VISIT MOD MDM: CPT

## 2020-12-25 PROCEDURE — 80307 DRUG TEST PRSMV CHEM ANLYZR: CPT

## 2020-12-25 PROCEDURE — 80329 ANALGESICS NON-OPIOID 1 OR 2: CPT

## 2020-12-25 PROCEDURE — 85025 COMPLETE CBC W/AUTO DIFF WBC: CPT

## 2020-12-25 PROCEDURE — 80299 QUANTITATIVE ASSAY DRUG: CPT

## 2020-12-25 PROCEDURE — 80053 COMPREHEN METABOLIC PANEL: CPT

## 2020-12-25 NOTE — NUR
PT CLEARED FOR DC. PT REFUSED TO DC. SECURITY CALLED, [employer] CALLED TO ASSIST W 
DC. PT WHEELED OUT W LAW ENFORCMENT. PT CALLING STAFF RACIAL SLURS AND 
INAPPROPIATE LANGUAGE.

## 2020-12-25 NOTE — NUR
THIS IS A 59YO F BIB EMS FROM [facility] W/ C/O SI. PT REPORTS SHE WANTS 
TO DRINK HERSELF TO DEATH. PER EMS, PT VERBALLY AGGRESSIVE AND MANIC UPON 
ARRIVAL. PT STATES SHES IN ED BECAUSE "I'M DEAD". PT SEEN AT [facility] 
12/23/20. PT REPORTS SHE IS UNABLE TO WALK OR MOVE EXTREMITIES. PT SOAKED IN 
URINE AND FECES. CLOTHING REMOVED, LOCKED IN SAFE KEEPING. PT CHANGED INTO 
GOWN, GARAGE DOORS DOWN FOR SAFETY. SITTER REQUESTED FROM CHARGE RN. RENE REEVES. 
NATY JANG AT BEDSIDE FOR ED EVAL.

## 2020-12-25 NOTE — NUR
[facility] HERE TO  PT. PT YELLING AT STAFF, "YOU DUMB ASS HOES, STUPID 
ASS BITCH. GET MY CLOTHES ON YOU STUPID BITCH" PT PROVIDED WITH CLEAN CLOTHES 
FROM [facility] [employer] "ITS CAUSE IM BLACK HUH" PT GIVEN ALL BELONGINGS FROM LOCKER 
INCLUDING SOILED CLOTHING DOUBLE BAGGED

## 2020-12-25 NOTE — NUR
PT HAD ANOTHER EPISODE OF BOWEL AND BLADDER INCONTINENCE. PT ASSISTED TO 
BEDSIDE COMMODE W/ 2 PERSON ASSIST. SHEETS AND CHUCKS CHANGED. URINE COLLECTED 
AND SENT TO LAB. PT PROVIDED W/ SAFETY TRAY PER PT REQUEST FOR FOOD. GARAGE 
DOORS DOWN AND SITTER OUTSIDE ROOM FOR SAFETY.

## 2020-12-25 NOTE — NUR
REPORT GIVEN TO MARGARITA BARRETT, UPDATED ON POC. PT RESTING ON GURNEY W/ SIDE RAILS 
UPX2, GARAGE DOORS DOWN AND SITTER OUTSIDE ROOM FOR SAFETY. RESP EVEN AND 
UNLABORED, RENE.

## 2020-12-25 NOTE — NUR
REPORT RECIEVED FROM CITLALY BARRETT. ASSUMED CARE OF PT AT THIS TIME. MARN CANELO 
IN TO EVAL PT. PT TO BE DC'D PER MARN

## 2020-12-25 NOTE — NUR
PT RECENTLY SEEN HERE IN ER TODAY FOR SI. MD [place] AT BEDSIDE FOR POC. PT 
VERBALLY AGRESSIVE TOWARDS STAFF.

## 2020-12-30 ENCOUNTER — HOSPITAL ENCOUNTER (EMERGENCY)
Dept: HOSPITAL 8 - ED | Age: 60
End: 2020-12-30
Payer: MEDICARE

## 2020-12-30 VITALS — DIASTOLIC BLOOD PRESSURE: 86 MMHG | SYSTOLIC BLOOD PRESSURE: 122 MMHG

## 2020-12-30 VITALS — BODY MASS INDEX: 28.3 KG/M2 | WEIGHT: 149.91 LBS | HEIGHT: 61 IN

## 2020-12-30 DIAGNOSIS — F25.9: Primary | ICD-10-CM

## 2020-12-30 DIAGNOSIS — R45.851: ICD-10-CM

## 2020-12-30 PROCEDURE — 99284 EMERGENCY DEPT VISIT MOD MDM: CPT

## 2020-12-30 NOTE — NUR
APN IN TO EVAL PT, PT TO BE DC'D. GIVEN FRESH CLOTHING FROM CLOTHING LOCKER, PT 
GIVEN CAB VOUCHER, PT ASSISTED IN CAB.

## 2020-12-30 NOTE — NUR
PT BIB [facility] FOR C/O NOT BEING ABLE TO WALK, EMS VISULAIZE PT WALK TO DOOR TO 
UNLOCK FOR EMS. PT THEN STATES "IM STILL SUICIDAL, I NEED DR RING" PT ARRIVES IN 
HOSPITAL GOWN, "I GO TO THE HOSPITAL EVERY DAY" PT SATURATED IN URINE ON 
ARRIVAL. PT CLEANSED AND PLACED IN NEW HOSPITAL GOWN. PT IN SECURE RM AT THIS 
TIME, AWAITING TO BE EVALED BY CANELO MEDRANO. 



CHARGE RN MADE AWARE, PT NOT LH AT THIS TIME.